# Patient Record
Sex: FEMALE | Race: WHITE | NOT HISPANIC OR LATINO | Employment: UNEMPLOYED | URBAN - METROPOLITAN AREA
[De-identification: names, ages, dates, MRNs, and addresses within clinical notes are randomized per-mention and may not be internally consistent; named-entity substitution may affect disease eponyms.]

---

## 2023-01-25 PROBLEM — I10 BENIGN ESSENTIAL HYPERTENSION: Status: ACTIVE | Noted: 2023-01-25

## 2023-01-25 PROBLEM — E66.9 CLASS 2 OBESITY WITH BODY MASS INDEX (BMI) OF 35.0 TO 35.9 IN ADULT: Status: ACTIVE | Noted: 2023-01-25

## 2023-01-25 PROBLEM — H91.93 BILATERAL HEARING LOSS: Status: ACTIVE | Noted: 2023-01-25

## 2023-01-25 PROBLEM — E66.812 CLASS 2 OBESITY WITH BODY MASS INDEX (BMI) OF 35.0 TO 35.9 IN ADULT: Status: ACTIVE | Noted: 2023-01-25

## 2023-01-25 PROBLEM — M79.671 PAIN OF RIGHT HEEL: Status: ACTIVE | Noted: 2023-01-25

## 2023-01-25 PROBLEM — F80.9 COMMUNICATION DEFICIT: Status: ACTIVE | Noted: 2023-01-25

## 2023-01-25 PROBLEM — R74.8 ELEVATED ALKALINE PHOSPHATASE LEVEL: Status: ACTIVE | Noted: 2023-01-25

## 2023-01-25 PROBLEM — I25.10 ATHSCL HEART DISEASE OF NATIVE CORONARY ARTERY W/O ANG PCTRS: Status: ACTIVE | Noted: 2023-01-25

## 2023-01-25 PROBLEM — M72.2 PLANTAR FASCIITIS, RIGHT: Status: ACTIVE | Noted: 2023-01-25

## 2023-01-25 PROBLEM — K21.9 GERD (GASTROESOPHAGEAL REFLUX DISEASE): Status: ACTIVE | Noted: 2023-01-25

## 2023-01-25 PROBLEM — M67.01 ACQUIRED SHORT ACHILLES TENDON OF RIGHT LOWER EXTREMITY: Status: ACTIVE | Noted: 2023-01-25

## 2023-01-25 PROBLEM — L85.3 DRY SKIN: Status: ACTIVE | Noted: 2023-01-25

## 2023-01-25 PROBLEM — E78.5 HYPERLIPIDEMIA: Status: ACTIVE | Noted: 2023-01-25

## 2023-03-07 ENCOUNTER — APPOINTMENT (OUTPATIENT)
Dept: PRIMARY CARE | Facility: CLINIC | Age: 53
End: 2023-03-07
Payer: COMMERCIAL

## 2023-03-09 ENCOUNTER — APPOINTMENT (OUTPATIENT)
Dept: PRIMARY CARE | Facility: CLINIC | Age: 53
End: 2023-03-09
Payer: COMMERCIAL

## 2023-03-16 ENCOUNTER — OFFICE VISIT (OUTPATIENT)
Dept: PRIMARY CARE | Facility: CLINIC | Age: 53
End: 2023-03-16
Payer: COMMERCIAL

## 2023-03-16 ENCOUNTER — LAB (OUTPATIENT)
Dept: LAB | Facility: LAB | Age: 53
End: 2023-03-16
Payer: COMMERCIAL

## 2023-03-16 VITALS
BODY MASS INDEX: 34.95 KG/M2 | HEIGHT: 60 IN | DIASTOLIC BLOOD PRESSURE: 77 MMHG | HEART RATE: 59 BPM | SYSTOLIC BLOOD PRESSURE: 136 MMHG | WEIGHT: 178 LBS | OXYGEN SATURATION: 96 %

## 2023-03-16 DIAGNOSIS — Z12.31 ENCOUNTER FOR SCREENING MAMMOGRAM FOR MALIGNANT NEOPLASM OF BREAST: ICD-10-CM

## 2023-03-16 DIAGNOSIS — I25.10 ATHSCL HEART DISEASE OF NATIVE CORONARY ARTERY W/O ANG PCTRS: ICD-10-CM

## 2023-03-16 DIAGNOSIS — Z00.00 ROUTINE GENERAL MEDICAL EXAMINATION AT HEALTH CARE FACILITY: Primary | ICD-10-CM

## 2023-03-16 DIAGNOSIS — H90.6 MIXED CONDUCTIVE AND SENSORINEURAL HEARING LOSS OF BOTH EARS: ICD-10-CM

## 2023-03-16 DIAGNOSIS — E78.2 MIXED HYPERLIPIDEMIA: ICD-10-CM

## 2023-03-16 DIAGNOSIS — K21.9 GASTROESOPHAGEAL REFLUX DISEASE WITHOUT ESOPHAGITIS: ICD-10-CM

## 2023-03-16 DIAGNOSIS — E66.9 CLASS 1 OBESITY WITH SERIOUS COMORBIDITY AND BODY MASS INDEX (BMI) OF 34.0 TO 34.9 IN ADULT, UNSPECIFIED OBESITY TYPE: ICD-10-CM

## 2023-03-16 DIAGNOSIS — Z23 NEED FOR VACCINATION: ICD-10-CM

## 2023-03-16 DIAGNOSIS — I10 BENIGN ESSENTIAL HYPERTENSION: ICD-10-CM

## 2023-03-16 PROBLEM — M72.2 PLANTAR FASCIITIS, RIGHT: Status: RESOLVED | Noted: 2023-01-25 | Resolved: 2023-03-16

## 2023-03-16 PROBLEM — M79.671 PAIN OF RIGHT HEEL: Status: RESOLVED | Noted: 2023-01-25 | Resolved: 2023-03-16

## 2023-03-16 LAB
ALANINE AMINOTRANSFERASE (SGPT) (U/L) IN SER/PLAS: 27 U/L (ref 7–45)
ALBUMIN (G/DL) IN SER/PLAS: 4.4 G/DL (ref 3.4–5)
ALKALINE PHOSPHATASE (U/L) IN SER/PLAS: 108 U/L (ref 33–110)
ANION GAP IN SER/PLAS: 15 MMOL/L (ref 10–20)
ASPARTATE AMINOTRANSFERASE (SGOT) (U/L) IN SER/PLAS: 20 U/L (ref 9–39)
BILIRUBIN TOTAL (MG/DL) IN SER/PLAS: 0.6 MG/DL (ref 0–1.2)
CALCIUM (MG/DL) IN SER/PLAS: 9.8 MG/DL (ref 8.6–10.3)
CARBON DIOXIDE, TOTAL (MMOL/L) IN SER/PLAS: 30 MMOL/L (ref 21–32)
CHLORIDE (MMOL/L) IN SER/PLAS: 101 MMOL/L (ref 98–107)
CHOLESTEROL (MG/DL) IN SER/PLAS: 147 MG/DL (ref 0–199)
CHOLESTEROL IN HDL (MG/DL) IN SER/PLAS: 46.5 MG/DL
CHOLESTEROL/HDL RATIO: 3.2
CREATININE (MG/DL) IN SER/PLAS: 0.71 MG/DL (ref 0.5–1.05)
ERYTHROCYTE DISTRIBUTION WIDTH (RATIO) BY AUTOMATED COUNT: 13.1 % (ref 11.5–14.5)
ERYTHROCYTE MEAN CORPUSCULAR HEMOGLOBIN CONCENTRATION (G/DL) BY AUTOMATED: 32.6 G/DL (ref 32–36)
ERYTHROCYTE MEAN CORPUSCULAR VOLUME (FL) BY AUTOMATED COUNT: 91 FL (ref 80–100)
ERYTHROCYTES (10*6/UL) IN BLOOD BY AUTOMATED COUNT: 4.83 X10E12/L (ref 4–5.2)
GFR FEMALE: >90 ML/MIN/1.73M2
GLUCOSE (MG/DL) IN SER/PLAS: 115 MG/DL (ref 74–99)
HEMATOCRIT (%) IN BLOOD BY AUTOMATED COUNT: 43.8 % (ref 36–46)
HEMOGLOBIN (G/DL) IN BLOOD: 14.3 G/DL (ref 12–16)
LDL: 78 MG/DL (ref 0–99)
LEUKOCYTES (10*3/UL) IN BLOOD BY AUTOMATED COUNT: 6.2 X10E9/L (ref 4.4–11.3)
PLATELETS (10*3/UL) IN BLOOD AUTOMATED COUNT: 281 X10E9/L (ref 150–450)
POTASSIUM (MMOL/L) IN SER/PLAS: 4.3 MMOL/L (ref 3.5–5.3)
PROTEIN TOTAL: 6.9 G/DL (ref 6.4–8.2)
SODIUM (MMOL/L) IN SER/PLAS: 142 MMOL/L (ref 136–145)
TRIGLYCERIDE (MG/DL) IN SER/PLAS: 115 MG/DL (ref 0–149)
UREA NITROGEN (MG/DL) IN SER/PLAS: 15 MG/DL (ref 6–23)
VLDL: 23 MG/DL (ref 0–40)

## 2023-03-16 PROCEDURE — 3008F BODY MASS INDEX DOCD: CPT | Performed by: NURSE PRACTITIONER

## 2023-03-16 PROCEDURE — 99213 OFFICE O/P EST LOW 20 MIN: CPT | Performed by: NURSE PRACTITIONER

## 2023-03-16 PROCEDURE — 3078F DIAST BP <80 MM HG: CPT | Performed by: NURSE PRACTITIONER

## 2023-03-16 PROCEDURE — 80061 LIPID PANEL: CPT

## 2023-03-16 PROCEDURE — 80053 COMPREHEN METABOLIC PANEL: CPT

## 2023-03-16 PROCEDURE — 90471 IMMUNIZATION ADMIN: CPT | Performed by: NURSE PRACTITIONER

## 2023-03-16 PROCEDURE — 36415 COLL VENOUS BLD VENIPUNCTURE: CPT

## 2023-03-16 PROCEDURE — 3075F SYST BP GE 130 - 139MM HG: CPT | Performed by: NURSE PRACTITIONER

## 2023-03-16 PROCEDURE — 1036F TOBACCO NON-USER: CPT | Performed by: NURSE PRACTITIONER

## 2023-03-16 PROCEDURE — 82652 VIT D 1 25-DIHYDROXY: CPT

## 2023-03-16 PROCEDURE — 90750 HZV VACC RECOMBINANT IM: CPT | Performed by: NURSE PRACTITIONER

## 2023-03-16 PROCEDURE — 85027 COMPLETE CBC AUTOMATED: CPT

## 2023-03-16 ASSESSMENT — ACTIVITIES OF DAILY LIVING (ADL)
GROCERY_SHOPPING: INDEPENDENT
BATHING: INDEPENDENT
DOING_HOUSEWORK: INDEPENDENT
MANAGING_FINANCES: INDEPENDENT
DRESSING: INDEPENDENT
TAKING_MEDICATION: INDEPENDENT

## 2023-03-16 ASSESSMENT — ENCOUNTER SYMPTOMS
OCCASIONAL FEELINGS OF UNSTEADINESS: 0
DEPRESSION: 0
LOSS OF SENSATION IN FEET: 0

## 2023-03-16 ASSESSMENT — PATIENT HEALTH QUESTIONNAIRE - PHQ9
2. FEELING DOWN, DEPRESSED OR HOPELESS: NOT AT ALL
SUM OF ALL RESPONSES TO PHQ9 QUESTIONS 1 AND 2: 0
1. LITTLE INTEREST OR PLEASURE IN DOING THINGS: NOT AT ALL

## 2023-03-16 NOTE — PATIENT INSTRUCTIONS
Thank you for seeing me today.  It was a pleasure to see you again!    Continue all medications as prescribed    See Cardiology in August    Lab work ordered today.  Please have your blood drawn in the next 1-2 weeks.  You need to be FASTING for 12 hours prior to blood draw.  You may only have water.  Please contact your insurance company to ask about the best location to get blood drawn.  We will contact you with the results of your blood work and any necessary adjustments  to your plan of care, if you do not hear from us within 3-5 days of having your blood drawn, please call the office at [phone].    Mammogram ordered for Dec 2023    RTC 1 YEAR AND AS NEEDED

## 2023-03-16 NOTE — ASSESSMENT & PLAN NOTE
Seeing cardiology, Dr. Uribe every 6 months   Taking Metoprolol, 100 mg, Amlodipine 10 mg and Lisinopril 10 mg  BP wbxto=110/77

## 2023-03-16 NOTE — PROGRESS NOTES
Subjective   Reason for Visit: Baylee Peck is an 52 y.o. female here for a Medicare Wellness visit and routine FU       HPI    No new issues since last follow-up, feels well.      PMH: HLD, CAD, GERD, HTN, DEAF     NICHELLE was used for this visit as pt is hearing impaired      #HLD  Taking Atorvastatin - no issues  denies myalgias     #HTN/CAD  Seeing cardiology, Dr. Uribe; LOV Feb 2023  Taking Metoprolol, 100 mg, Amlodipine 10 mg and Lisinopril 10 mg  BP xlvfn=969/77  Does not take at home, is looking for one to use at home     #GERD  Taking Omeprazole 20 mg  sx: CONTROLLED      #HM  MAMM: DUE DEC 2023--> ORDERED   PAP: 2021 - Due December 2024  COLOGUARD: Due 2024  FBW: due   SHINGLES: Will schedule with pharmacy, has not had yet  COVID: Both vaccines, no booster     Patient Care Team:  MARIBEL Reeves-CNP as PCP - General     Review of Systems  All 13 systems were reviewed and are within normal limits except positive and pertinent negative responses which are noted below or in HPI.      Objective   Vitals:  /77   Pulse 59   Ht 1.524 m (5')   Wt 80.7 kg (178 lb)   SpO2 96%   BMI 34.76 kg/m²       Physical Exam  Vitals reviewed.   Constitutional:       Appearance: Normal appearance. She is normal weight.   HENT:      Right Ear: Tympanic membrane normal. There is no impacted cerumen.      Left Ear: Tympanic membrane normal. There is no impacted cerumen.      Mouth/Throat:      Mouth: Mucous membranes are moist.   Eyes:      Conjunctiva/sclera: Conjunctivae normal.   Cardiovascular:      Rate and Rhythm: Normal rate.      Pulses: Normal pulses.      Heart sounds: Normal heart sounds.   Pulmonary:      Effort: Pulmonary effort is normal.   Abdominal:      General: Abdomen is flat. Bowel sounds are normal.      Palpations: Abdomen is soft.   Musculoskeletal:         General: Normal range of motion.   Skin:     General: Skin is warm and dry.      Capillary Refill: Capillary refill takes less than 2  seconds.   Neurological:      General: No focal deficit present.      Mental Status: She is alert.   Psychiatric:         Mood and Affect: Mood normal.         Assessment/Plan   Problem List Items Addressed This Visit          Circulatory    Athscl heart disease of native coronary artery w/o ang pctrs    Current Assessment & Plan     Seeing cardiology, Dr. Uribe; LOV   Taking Metoprolol, 100 mg         Relevant Orders    CBC    Benign essential hypertension    Current Assessment & Plan     Seeing cardiology, Dr. Uribe every 6 months   Taking Metoprolol, 100 mg, Amlodipine 10 mg and Lisinopril 10 mg  BP egrxg=528/77         Relevant Orders    Comprehensive Metabolic Panel    Lipid Panel       Digestive    GERD (gastroesophageal reflux disease)    Current Assessment & Plan     Taking Omeprazole 20 mg  sx: CONTROLLED             Other    Bilateral hearing loss    Hyperlipidemia    Current Assessment & Plan     Taking Atorvastatin - no issues  denies myalgias          Other Visit Diagnoses       Routine general medical examination at health care facility    -  Primary    Class 1 obesity with serious comorbidity and body mass index (BMI) of 34.0 to 34.9 in adult, unspecified obesity type        Relevant Orders    Vitamin D 1,25 Dihydroxy    Need for vaccination        Relevant Orders    Zoster vaccine, recombinant, adult (SHINGRIX)    Encounter for screening mammogram for malignant neoplasm of breast        Relevant Orders    BI mammo bilateral screening tomosynthesis

## 2023-03-20 LAB — VITAMIN D 1,25-DIHYDROXY: 84.8 PG/ML (ref 19.9–79.3)

## 2023-05-31 DIAGNOSIS — I10 BENIGN ESSENTIAL HYPERTENSION: ICD-10-CM

## 2023-05-31 RX ORDER — LISINOPRIL 10 MG/1
10 TABLET ORAL DAILY
Qty: 90 TABLET | Refills: 1 | Status: SHIPPED | OUTPATIENT
Start: 2023-05-31 | End: 2023-08-29

## 2023-06-13 ENCOUNTER — CLINICAL SUPPORT (OUTPATIENT)
Dept: PRIMARY CARE | Facility: CLINIC | Age: 53
End: 2023-06-13
Payer: COMMERCIAL

## 2023-06-13 DIAGNOSIS — Z23 IMMUNIZATION DUE: ICD-10-CM

## 2023-06-13 PROCEDURE — 90471 IMMUNIZATION ADMIN: CPT

## 2023-06-13 PROCEDURE — 90750 HZV VACC RECOMBINANT IM: CPT

## 2023-07-20 ENCOUNTER — TELEPHONE (OUTPATIENT)
Dept: PRIMARY CARE | Facility: CLINIC | Age: 53
End: 2023-07-20
Payer: COMMERCIAL

## 2023-07-20 DIAGNOSIS — I25.10 ATHSCL HEART DISEASE OF NATIVE CORONARY ARTERY W/O ANG PCTRS: Primary | ICD-10-CM

## 2023-07-20 NOTE — TELEPHONE ENCOUNTER
Pt called asking for a new letter for a Handicapped Placard.    Send back to me when complete, I will place in the mail per pt's request.

## 2023-10-30 ENCOUNTER — APPOINTMENT (OUTPATIENT)
Dept: CARDIOLOGY | Facility: CLINIC | Age: 53
End: 2023-10-30
Payer: COMMERCIAL

## 2023-11-02 PROBLEM — M79.643 HAND PAIN: Status: ACTIVE | Noted: 2023-11-02

## 2023-11-02 PROBLEM — J06.9 ACUTE UPPER RESPIRATORY INFECTION: Status: ACTIVE | Noted: 2023-11-02

## 2023-11-02 PROBLEM — B99.9 INFECTION: Status: ACTIVE | Noted: 2023-01-25

## 2023-11-02 PROBLEM — S69.90XA INJURY OF FINGER: Status: ACTIVE | Noted: 2023-11-02

## 2023-11-02 PROBLEM — K80.50 BILIARY COLIC: Status: ACTIVE | Noted: 2023-11-02

## 2023-11-02 PROBLEM — R06.02 SHORTNESS OF BREATH: Status: ACTIVE | Noted: 2023-11-02

## 2023-11-02 PROBLEM — J02.9 SORE THROAT: Status: ACTIVE | Noted: 2023-11-02

## 2023-11-02 PROBLEM — K62.89 RECTAL PAIN: Status: ACTIVE | Noted: 2023-11-02

## 2023-11-02 PROBLEM — I36.9 NONRHEUMATIC TRICUSPID VALVE DISORDER: Status: ACTIVE | Noted: 2023-11-02

## 2023-11-02 PROBLEM — I82.90 VENOUS THROMBOSIS: Status: ACTIVE | Noted: 2023-11-02

## 2023-11-02 PROBLEM — H91.90 PARTIAL DEAFNESS: Status: ACTIVE | Noted: 2023-11-02

## 2023-11-02 PROBLEM — D25.9 UTERINE LEIOMYOMA: Status: ACTIVE | Noted: 2023-11-02

## 2023-11-02 PROBLEM — R10.12 LEFT UPPER QUADRANT PAIN: Status: ACTIVE | Noted: 2023-11-02

## 2023-11-02 PROBLEM — E78.2 MIXED HYPERLIPIDEMIA: Status: ACTIVE | Noted: 2023-01-25

## 2023-11-02 PROBLEM — S09.92XA NOSE INJURY: Status: ACTIVE | Noted: 2023-11-02

## 2023-11-02 PROBLEM — M54.2 NECK PAIN: Status: ACTIVE | Noted: 2023-11-02

## 2023-11-02 PROBLEM — I25.2 OLD MYOCARDIAL INFARCTION: Status: ACTIVE | Noted: 2023-11-02

## 2023-11-02 PROBLEM — J00 COMMON COLD: Status: ACTIVE | Noted: 2023-11-02

## 2023-11-02 PROBLEM — J90 PLEURAL EFFUSION: Status: ACTIVE | Noted: 2023-11-02

## 2023-11-02 PROBLEM — K64.9 HEMORRHOIDS: Status: ACTIVE | Noted: 2023-11-02

## 2023-11-02 PROBLEM — R00.2 PALPITATIONS: Status: ACTIVE | Noted: 2023-11-02

## 2023-11-02 PROBLEM — R07.89 CHEST WALL PAIN: Status: ACTIVE | Noted: 2023-11-02

## 2023-11-02 PROBLEM — J01.90 ACUTE SINUSITIS: Status: ACTIVE | Noted: 2023-11-02

## 2023-11-02 PROBLEM — R10.2 PAIN IN PELVIS: Status: ACTIVE | Noted: 2023-11-02

## 2023-11-02 PROBLEM — R05.9 COUGH: Status: ACTIVE | Noted: 2023-11-02

## 2023-11-02 PROBLEM — J11.1 INFLUENZA-LIKE ILLNESS: Status: ACTIVE | Noted: 2023-11-02

## 2023-11-02 RX ORDER — RANOLAZINE 500 MG/1
500 TABLET, EXTENDED RELEASE ORAL 2 TIMES DAILY
COMMUNITY

## 2023-11-02 RX ORDER — ASPIRIN 81 MG/1
81 TABLET ORAL DAILY
COMMUNITY
End: 2024-02-08 | Stop reason: SDUPTHER

## 2023-11-02 RX ORDER — VIT C/E/ZN/COPPR/LUTEIN/ZEAXAN 250MG-90MG
25 CAPSULE ORAL DAILY
COMMUNITY
End: 2024-02-08 | Stop reason: SDUPTHER

## 2023-11-02 RX ORDER — METOPROLOL TARTRATE 25 MG/1
25 TABLET, FILM COATED ORAL 2 TIMES DAILY
COMMUNITY

## 2023-11-02 RX ORDER — LISINOPRIL 10 MG/1
10 TABLET ORAL DAILY
COMMUNITY
End: 2023-11-03 | Stop reason: SDUPTHER

## 2023-11-02 RX ORDER — LISINOPRIL 2.5 MG/1
2.5 TABLET ORAL DAILY
COMMUNITY

## 2023-11-02 RX ORDER — SULFAMETHOXAZOLE AND TRIMETHOPRIM 800; 160 MG/1; MG/1
1 TABLET ORAL 2 TIMES DAILY
COMMUNITY

## 2023-11-02 RX ORDER — ONDANSETRON 4 MG/1
4 TABLET, ORALLY DISINTEGRATING ORAL EVERY 6 HOURS PRN
COMMUNITY

## 2023-11-02 RX ORDER — FERROUS SULFATE 325(65) MG
65 TABLET ORAL DAILY
COMMUNITY

## 2023-11-02 RX ORDER — OMEPRAZOLE 20 MG/1
20 CAPSULE, DELAYED RELEASE ORAL DAILY
COMMUNITY

## 2023-11-02 RX ORDER — ASPIRIN 325 MG
325 TABLET ORAL DAILY
COMMUNITY

## 2023-11-02 RX ORDER — IBUPROFEN 600 MG/1
600 TABLET ORAL EVERY 6 HOURS PRN
COMMUNITY

## 2023-11-02 RX ORDER — CLOPIDOGREL BISULFATE 75 MG/1
75 TABLET ORAL DAILY
COMMUNITY

## 2023-11-02 RX ORDER — FERROUS SULFATE, DRIED 160(50) MG
1 TABLET, EXTENDED RELEASE ORAL
COMMUNITY

## 2023-11-02 RX ORDER — HYDROCODONE BITARTRATE AND ACETAMINOPHEN 5; 325 MG/1; MG/1
1 TABLET ORAL EVERY 6 HOURS PRN
COMMUNITY

## 2023-11-02 RX ORDER — ATORVASTATIN CALCIUM 80 MG/1
80 TABLET, FILM COATED ORAL DAILY
COMMUNITY

## 2023-11-02 RX ORDER — CYCLOBENZAPRINE HCL 10 MG
10 TABLET ORAL 2 TIMES DAILY PRN
COMMUNITY

## 2023-11-03 ENCOUNTER — OFFICE VISIT (OUTPATIENT)
Dept: CARDIOLOGY | Facility: CLINIC | Age: 53
End: 2023-11-03
Payer: COMMERCIAL

## 2023-11-03 VITALS
OXYGEN SATURATION: 98 % | RESPIRATION RATE: 18 BRPM | DIASTOLIC BLOOD PRESSURE: 75 MMHG | SYSTOLIC BLOOD PRESSURE: 140 MMHG | TEMPERATURE: 98.9 F | BODY MASS INDEX: 35.34 KG/M2 | HEART RATE: 59 BPM | HEIGHT: 60 IN | WEIGHT: 180 LBS

## 2023-11-03 DIAGNOSIS — I25.10 CORONARY ARTERY DISEASE INVOLVING NATIVE CORONARY ARTERY OF NATIVE HEART WITHOUT ANGINA PECTORIS: Primary | ICD-10-CM

## 2023-11-03 DIAGNOSIS — R00.2 PALPITATIONS: ICD-10-CM

## 2023-11-03 DIAGNOSIS — I10 PRIMARY HYPERTENSION: ICD-10-CM

## 2023-11-03 DIAGNOSIS — I25.2 OLD MYOCARDIAL INFARCTION: ICD-10-CM

## 2023-11-03 DIAGNOSIS — E78.2 MIXED HYPERLIPIDEMIA: ICD-10-CM

## 2023-11-03 PROCEDURE — 1036F TOBACCO NON-USER: CPT | Performed by: INTERNAL MEDICINE

## 2023-11-03 PROCEDURE — 99214 OFFICE O/P EST MOD 30 MIN: CPT | Performed by: INTERNAL MEDICINE

## 2023-11-03 PROCEDURE — 3078F DIAST BP <80 MM HG: CPT | Performed by: INTERNAL MEDICINE

## 2023-11-03 PROCEDURE — 3077F SYST BP >= 140 MM HG: CPT | Performed by: INTERNAL MEDICINE

## 2023-11-03 PROCEDURE — 3008F BODY MASS INDEX DOCD: CPT | Performed by: INTERNAL MEDICINE

## 2023-11-03 RX ORDER — LISINOPRIL 20 MG/1
20 TABLET ORAL DAILY
Qty: 90 TABLET | Refills: 3 | Status: SHIPPED | OUTPATIENT
Start: 2023-11-03 | End: 2024-11-02

## 2023-11-03 RX ORDER — AZITHROMYCIN 250 MG/1
250 TABLET, FILM COATED ORAL DAILY
COMMUNITY
Start: 2023-10-08

## 2023-11-03 RX ORDER — BENZONATATE 200 MG/1
200 CAPSULE ORAL 3 TIMES DAILY PRN
COMMUNITY
Start: 2023-10-08

## 2023-11-03 ASSESSMENT — PATIENT HEALTH QUESTIONNAIRE - PHQ9
SUM OF ALL RESPONSES TO PHQ9 QUESTIONS 1 AND 2: 0
1. LITTLE INTEREST OR PLEASURE IN DOING THINGS: NOT AT ALL
2. FEELING DOWN, DEPRESSED OR HOPELESS: NOT AT ALL

## 2023-11-03 ASSESSMENT — PAIN SCALES - GENERAL: PAINLEVEL: 0-NO PAIN

## 2023-11-03 NOTE — PROGRESS NOTES
History of present illness:    53 year old female patient here today following up on hx of CAD status post PCI to LAD. Patient returns to my office for follow-up. She did have cardiac catheterization 2019 that showed stable coronary artery disease and patent LAD stent.  Patient returns to my office for follow-up.  Doing overall good denies any chest pain or shortness of breath.  No dizziness lightheadedness or palpitations.    Past Medical History:   Diagnosis Date    Athscl heart disease of native coronary artery w/o ang pctrs 01/25/2023    Benign essential hypertension 01/25/2023    Chondrocostal junction syndrome (tietze) 04/07/2020    Costochondritis    Coronary arteriosclerosis 01/25/2023    Encounter for follow-up examination after completed treatment for conditions other than malignant neoplasm 04/07/2020    Hospital discharge follow-up    Encounter for follow-up examination after completed treatment for conditions other than malignant neoplasm 04/23/2019    Hospital discharge follow-up    Hyperlipidemia 01/25/2023    Hypertensive disorder 01/25/2023    Left upper quadrant pain 05/19/2020    Left upper quadrant abdominal pain    Mixed hyperlipidemia 01/25/2023    Nonrheumatic tricuspid valve disorder 11/02/2023    Old myocardial infarction 11/02/2023    Palpitations 11/02/2023    Personal history of diseases of the blood and blood-forming organs and certain disorders involving the immune mechanism 07/06/2016    History of iron deficiency anemia    Personal history of diseases of the blood and blood-forming organs and certain disorders involving the immune mechanism 05/27/2017    History of anemia    Personal history of other diseases of the digestive system 11/13/2014    History of constipation    Personal history of other diseases of the musculoskeletal system and connective tissue 05/21/2019    History of low back pain    Personal history of other diseases of the respiratory system 10/24/2019    History of  acute sinusitis    Personal history of other drug therapy 10/28/2016    History of influenza vaccination    Personal history of other endocrine, nutritional and metabolic disease 10/05/2018    History of hyperglycemia    Radiculopathy, site unspecified 11/13/2014    Radiculitis of leg    Strain of muscle, fascia and tendon of lower back, initial encounter 11/13/2014    Lumbar strain       Past Surgical History:   Procedure Laterality Date    OTHER SURGICAL HISTORY  11/12/2014    Thorax Excision Of Soft Tissue Tumor    OTHER SURGICAL HISTORY  03/09/2022    Cardiac catheterization with stent placement       Allergies   Allergen Reactions    Acetaminophen Other    Amoxicillin Hives    Nitroglycerin Unknown and Itching    Diphenhydramine Hcl Other    Nitro Itching        reports that she has never smoked. She has been exposed to tobacco smoke. She has never used smokeless tobacco. She reports that she does not drink alcohol and does not use drugs.    Family History   Problem Relation Name Age of Onset    Other (malignant neoplasm of female breast) Mother      Diabetes Father      No Known Problems Father's Sister      No Known Problems Father's Brother      Heart attack Maternal Grandmother      No Known Problems Paternal Grandmother      No Known Problems Paternal Grandfather         Patient's Medications   New Prescriptions    No medications on file   Previous Medications    AMLODIPINE (NORVASC) 10 MG TABLET    amLODIPine Besylate 10 MG Oral Tablet   Quantity: 90  Refills: 0        Start : 8-Aug-2022   Active    AMLODIPINE BESYLATE, BULK, MISC    Take 10 mg by mouth once daily.    ASPIRIN 325 MG TABLET    Take 1 tablet (325 mg) by mouth once daily.    ASPIRIN 325 MG TABLET    Take 1 tablet (325 mg) by mouth once daily.    ASPIRIN 81 MG EC TABLET    Take 1 tablet (81 mg) by mouth once daily.    ATORVASTATIN (LIPITOR) 40 MG TABLET    Take 1 tablet (40 mg) by mouth once daily.    ATORVASTATIN (LIPITOR) 80 MG TABLET     Take 1 tablet (80 mg) by mouth once daily.    AZITHROMYCIN (ZITHROMAX) 250 MG TABLET    Take 1 tablet (250 mg) by mouth once daily.    BENZONATATE (TESSALON) 200 MG CAPSULE    Take 1 capsule (200 mg) by mouth 3 times a day as needed for cough.    CALCIUM CARBONATE-VITAMIN D3 (OYSTER SHELL CALCIUM-VIT D3) 500 MG-5 MCG (200 UNIT) TABLET    Take 1 tablet by mouth once daily with a meal.    CHOLECALCIFEROL (VITAMIN D-3) 25 MCG (1000 UT) CAPSULE    Take 1 capsule (25 mcg) by mouth once daily.    CLOPIDOGREL (PLAVIX) 75 MG TABLET    Take 1 tablet (75 mg) by mouth once daily.    CYCLOBENZAPRINE (FLEXERIL) 10 MG TABLET    Take 1 tablet (10 mg) by mouth 2 times a day as needed for muscle spasms.    ERGOCALCIFEROL, VITAMIN D2, (VITAMIN D2 ORAL)    Vitamin D TABS   Refills: 0       Active    FERROUS SULFATE 325 (65 FE) MG TABLET    Take 1 tablet (65 mg of iron) by mouth once daily.    HYDROCODONE-ACETAMINOPHEN (NORCO) 5-325 MG TABLET    Take 1 tablet by mouth every 6 hours if needed for severe pain (7 - 10).    IBUPROFEN 600 MG TABLET    Take 1 tablet (600 mg) by mouth every 6 hours if needed for moderate pain (4 - 6).    LISINOPRIL 10 MG TABLET    Take 1 tablet (10 mg) by mouth once daily. For Blood pressure    LISINOPRIL 10 MG TABLET    Take 1 tablet (10 mg) by mouth once daily.    LISINOPRIL 2.5 MG TABLET    Take 1 tablet (2.5 mg) by mouth once daily.    METOPROLOL TARTRATE (LOPRESSOR) 100 MG TABLET    Take 1 tablet (100 mg) by mouth once daily.    METOPROLOL TARTRATE (LOPRESSOR) 25 MG TABLET    Take 1 tablet (25 mg) by mouth 2 times a day.    OMEPRAZOLE (PRILOSEC) 20 MG DR CAPSULE    Take 1 capsule (20 mg) by mouth once daily.    OMEPRAZOLE (PRILOSEC) 20 MG DR CAPSULE    Take 1 capsule (20 mg) by mouth once daily.    ONDANSETRON ODT (ZOFRAN-ODT) 4 MG DISINTEGRATING TABLET    Take 1 tablet (4 mg) by mouth every 6 hours if needed for vomiting or nausea.    RANOLAZINE (RANEXA) 500 MG 12 HR TABLET    Take 1 tablet (500 mg) by  mouth 2 times a day.    SULFAMETHOXAZOLE-TRIMETHOPRIM (BACTRIM DS) 800-160 MG TABLET    Take 1 tablet by mouth 2 times a day.   Modified Medications    No medications on file   Discontinued Medications    No medications on file       Objective   Physical Exam  General: Patient in no acute distress   HEENT: Atraumatic normocephalic.  Neck: Supple, jugular venous pressure within normal limit.  No bruits  Lungs: Clear to auscultation bilaterally  Cardiovascular: Regular rate and rhythm, normal heart sounds, no murmurs rubs or gallops  Abdomen: Soft nontender nondistended.  Normal bowel sounds.  Extremities: Warm to touch, no edema.      Lab Review   No visits with results within 2 Month(s) from this visit.   Latest known visit with results is:   Lab on 03/16/2023   Component Date Value    WBC 03/16/2023 6.2     RBC 03/16/2023 4.83     Hemoglobin 03/16/2023 14.3     Hematocrit 03/16/2023 43.8     MCV 03/16/2023 91     MCHC 03/16/2023 32.6     Platelets 03/16/2023 281     RDW 03/16/2023 13.1     Glucose 03/16/2023 115 (H)     Sodium 03/16/2023 142     Potassium 03/16/2023 4.3     Chloride 03/16/2023 101     Bicarbonate 03/16/2023 30     Anion Gap 03/16/2023 15     Urea Nitrogen 03/16/2023 15     Creatinine 03/16/2023 0.71     GFR Female 03/16/2023 >90     Calcium 03/16/2023 9.8     Albumin 03/16/2023 4.4     Alkaline Phosphatase 03/16/2023 108     Total Protein 03/16/2023 6.9     AST 03/16/2023 20     Total Bilirubin 03/16/2023 0.6     ALT (SGPT) 03/16/2023 27     Cholesterol 03/16/2023 147     HDL 03/16/2023 46.5     Cholesterol/HDL Ratio 03/16/2023 3.2     LDL 03/16/2023 78     VLDL 03/16/2023 23     Triglycerides 03/16/2023 115     Vit D, 1,25-Dihydroxy 03/16/2023 84.8 (H)         Assessment/Plan   Patient Active Problem List   Diagnosis    Coronary arteriosclerosis    Hypertensive disorder    Bilateral hearing loss    Dry skin    Infection    Elevated alkaline phosphatase level    Gastroesophageal reflux disease     Mixed hyperlipidemia    Acquired short Achilles tendon of right lower extremity    Class 2 obesity with body mass index (BMI) of 35.0 to 35.9 in adult    Neck pain    Pain in pelvis    Hand pain    Hemorrhoids    Influenza-like illness    Injury of finger    Left upper quadrant pain    Nonrheumatic tricuspid valve disorder    Nose injury    Old myocardial infarction    Palpitations    Partial deafness    Pleural effusion    Rectal pain    Shortness of breath    Sore throat    Uterine leiomyoma    Venous thrombosis    Acute sinusitis    Acute upper respiratory infection    Chest wall pain    Biliary colic    Common cold    Cough      53 year old female patient here today following up on hx of CAD status post PCI to LAD. Patient returns to my office for follow-up. She did have cardiac catheterization 2019 that showed stable coronary artery disease and patent LAD stent.  Patient returns to my office for follow-up.  Doing overall good denies any chest pain or shortness of breath.  No dizziness lightheadedness or palpitations.  Blood pressure running on the high normal level in the 140s.  I will increase her lisinopril to 20 mg oral daily.  We will also repeat another lipid panel since I increased her atorvastatin to 80 mg daily 6 weeks ago to make sure she is at better target her last LDL was 76.  Otherwise we will follow-up in 6 months.    Aram Parsons MD

## 2023-11-08 ENCOUNTER — TELEPHONE (OUTPATIENT)
Dept: PRIMARY CARE | Facility: CLINIC | Age: 53
End: 2023-11-08

## 2023-11-08 ENCOUNTER — LAB (OUTPATIENT)
Dept: LAB | Facility: LAB | Age: 53
End: 2023-11-08
Payer: COMMERCIAL

## 2023-11-08 DIAGNOSIS — E78.2 MIXED HYPERLIPIDEMIA: ICD-10-CM

## 2023-11-08 DIAGNOSIS — Z12.31 ENCOUNTER FOR SCREENING MAMMOGRAM FOR MALIGNANT NEOPLASM OF BREAST: ICD-10-CM

## 2023-11-08 DIAGNOSIS — Z12.39 BREAST SCREENING: Primary | ICD-10-CM

## 2023-11-08 LAB
CHOLEST SERPL-MCNC: 110 MG/DL (ref 0–199)
CHOLESTEROL/HDL RATIO: 2.5
HDLC SERPL-MCNC: 44.1 MG/DL
LDLC SERPL CALC-MCNC: 45 MG/DL
NON HDL CHOLESTEROL: 66 MG/DL (ref 0–149)
TRIGL SERPL-MCNC: 107 MG/DL (ref 0–149)
VLDL: 21 MG/DL (ref 0–40)

## 2023-11-08 PROCEDURE — 80061 LIPID PANEL: CPT

## 2023-11-08 PROCEDURE — 36415 COLL VENOUS BLD VENIPUNCTURE: CPT

## 2023-11-08 NOTE — TELEPHONE ENCOUNTER
Patient came in requesting a mammogram order be placed. Asked that it be mailed to her when ready so she knows it's okay to schedule appointment.

## 2023-12-13 NOTE — PROGRESS NOTES
Patient ID: Baylee Peck is a 53 y.o. female.  Primary Care Provider: Amanda Freeman, APRN-CNP    Subjective    History of stage IIB squamous cell cervical cancer diagnosed in November 2014.    Pretreatment MRI suggested cancer was extending to the outer margins of her cervix, abutting the rectum, and infiltrating into the proximal parametrial fat.  She had one left internal iliac lymph node which was PET avid.    She received chemoradiation therapy.  External beam radiation was completed in January 2015.  She then received 5 high-dose rate brachytherapy treatments at [facility].    This was completed in February 2015      Objective    Visit Vitals  /83 (BP Location: Right arm, Patient Position: Sitting, BP Cuff Size: Adult)   Pulse 76   Resp 20        Interval history: Patient is a 53 year old female with history of stage 2B squamous cell cervical cancer s/p chemoradiation followed by vaginal brachytherapy. Last tx 2/2015. 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  Last pap 12/2022 was negative/HPV-, showed atrophy.  Here for annual exam and pap.  Denies vaginal bleeding, pink-tinged discharge, constipation, diarrhea, urinary incontinence, or hematuria. Energy levels and appetite are adequate and unchanged. Not currently sexually active. States she is up to date on annual mammogram.      Physical Exam:    Constitutional: Doing well. KIERAN  Eyes: PERRL  ENMT: Moist mucus membranes  Head/Neck: Supple. Symmetrical  Cardiovascular: Regular, rate and rhythm. 2+ equal pulses of the extremities  Respiratory/Thorax: CTA. RRR. Chest rise symmetrical.  Gastrointestinal: Non-distended, soft, non-tender  Genitourinary: Cervical borders obliterated, os not visualized, no lesions noted, radiation changes noted.   Smooth vaginal walls noted.  No vulvar lesions noted.   Musculoskeletal: ROM intact, no joint swelling, normal strength  Extremities: No edema  Neurological: Alert and oriented x 3. Pleasant and  cooperative.  Lymphatic: No lymphadenopathy. No lymphedema  Psychological: Appropriate mood and behavior  Skin: Warm and dry, no lesions, no rashes    A complete review of systems was performed and all systems were normal except what is noted in the interval history.          Assessment/Plan   Patient is a 53 year old female with Stage 2B squamous cell cervical cancer s/p chemoradiation followed by vaginal brachytherapy. Last tx 2/2015. KIERAN 8 yr.       PLAN:  Pap today,F/U results  If negative F/U in 1 year or as needed  Physical examination was within normal limits today.  She is currently KIERAN.  We reviewed signs and symptoms of possible recurrence with the patient and she will call our office should she experience any of these.       MONA Zambrano     I was present with the APRN student who participated in the documentation of this note.  I have personally seen and re-examined the patient and performed the medical decision-making components (assessment and plan of care). I have reviewed the APRN student documentation and verified the findings in the note as written with additions or exceptions as stated in the body of this note.

## 2023-12-14 ENCOUNTER — OFFICE VISIT (OUTPATIENT)
Dept: GYNECOLOGIC ONCOLOGY | Facility: CLINIC | Age: 53
End: 2023-12-14
Payer: COMMERCIAL

## 2023-12-14 VITALS
HEART RATE: 76 BPM | SYSTOLIC BLOOD PRESSURE: 121 MMHG | RESPIRATION RATE: 20 BRPM | BODY MASS INDEX: 33.31 KG/M2 | WEIGHT: 181 LBS | DIASTOLIC BLOOD PRESSURE: 83 MMHG | HEIGHT: 62 IN

## 2023-12-14 DIAGNOSIS — C53.9 MALIGNANT NEOPLASM OF CERVIX, UNSPECIFIED SITE (MULTI): Primary | ICD-10-CM

## 2023-12-14 PROCEDURE — 3074F SYST BP LT 130 MM HG: CPT | Performed by: NURSE PRACTITIONER

## 2023-12-14 PROCEDURE — 1036F TOBACCO NON-USER: CPT | Performed by: NURSE PRACTITIONER

## 2023-12-14 PROCEDURE — 3008F BODY MASS INDEX DOCD: CPT | Performed by: NURSE PRACTITIONER

## 2023-12-14 PROCEDURE — 87624 HPV HI-RISK TYP POOLED RSLT: CPT

## 2023-12-14 PROCEDURE — 3079F DIAST BP 80-89 MM HG: CPT | Performed by: NURSE PRACTITIONER

## 2023-12-14 PROCEDURE — 99213 OFFICE O/P EST LOW 20 MIN: CPT | Performed by: NURSE PRACTITIONER

## 2023-12-14 ASSESSMENT — PAIN SCALES - GENERAL: PAINLEVEL: 0-NO PAIN

## 2023-12-14 NOTE — PROGRESS NOTES
Patient ID: Baylee Peck is a 53 y.o. female.  Primary Care Provider: Amanda Freeman, APRN-CNP        Subjective   History of stage IIB squamous cell cervical cancer diagnosed in November 2014.     Pretreatment MRI suggested cancer was extending to the outer margins of her cervix, abutting the rectum, and infiltrating into the proximal parametrial fat.  She had one left internal iliac lymph node which was PET avid.     She received chemoradiation therapy.  External beam radiation was completed in January 2015.  She then received 5 high-dose rate brachytherapy treatments at [facility].     This was completed in February 2015              Objective   There were no vitals taken for this visit.      Interval history: Patient is a 53 year old female with history of stage 2B squamous cell cervical cancer s/p chemoradiation followed by vaginal brachytherapy. Last tx 2/2015. 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  Last pap 12/2022 was negative/HPV-, showed atrophy.  Here for annual exam and pap.      Denies vaginal bleeding, pink-tinged discharge, constipation, diarrhea, urinary incontinence, or hematuria. Energy levels and appetite are adequate and unchanged. Not currently sexually active. States she is up to date on annual mammogram.     Physical Exam:     Constitutional: Doing well. KIERAN  Eyes: PERRL  ENMT: Moist mucus membranes  Head/Neck: Supple. Symmetrical  Cardiovascular: Regular, rate and rhythm. 2+ equal pulses of the extremities  Respiratory/Thorax: CTA. RRR. Chest rise symmetrical.  Gastrointestinal: Non-distended, soft, non-tender  Genitourinary:   Normal external female genitalia. No vulvar lesions noted  Speculum exam: Smooth vaginal walls without lesions or masses. Vaginal cuff visualized without lesions.   Bimanual exam: Smooth vaginal wall without lesions or masses.    Rectovaginal exam: smooth rectovaginal septum without lesions or masses  Musculoskeletal: ROM intact, no joint swelling, normal  strength  Extremities: No edema  Neurological: Alert and oriented x 3. Pleasant and cooperative.  Lymphatic: No lymphadenopathy. No lymphedema  Psychological: Appropriate mood and behavior  Skin: Warm and dry, no lesions, no rashes     A complete review of systems was performed and all systems were normal except what is noted in the interval history.        Assessment/Plan   Stage 2B squamous cell cervical cancer s/p chemoradiation followed by vaginal brachytherapy. Last tx 2/2015. KIERAN 7 yr.    PLAN:    Schedule next appointment pending pap smear results. If (-), schedule annual follow-up. If (+), schedule follow-up at your earliest convenience.  Call the office to schedule a sooner appointment if new or worsening symptoms occur.       MONA Zambrano    I was present with the APRN student who participated in the documentation of this note.  I have personally seen and re-examined the patient and performed the medical decision-making components (assessment and plan of care). I have reviewed the APRN student documentation and verified the findings in the note as written with additions or exceptions as stated in the body of this note.

## 2023-12-19 ENCOUNTER — HOSPITAL ENCOUNTER (OUTPATIENT)
Dept: RADIOLOGY | Facility: HOSPITAL | Age: 53
Discharge: HOME | End: 2023-12-19
Payer: COMMERCIAL

## 2023-12-19 VITALS — BODY MASS INDEX: 35.34 KG/M2 | HEIGHT: 60 IN | WEIGHT: 180 LBS

## 2023-12-19 DIAGNOSIS — Z12.31 ENCOUNTER FOR SCREENING MAMMOGRAM FOR MALIGNANT NEOPLASM OF BREAST: ICD-10-CM

## 2023-12-19 DIAGNOSIS — Z12.39 BREAST SCREENING: ICD-10-CM

## 2023-12-19 PROCEDURE — 77063 BREAST TOMOSYNTHESIS BI: CPT

## 2023-12-19 PROCEDURE — 77067 SCR MAMMO BI INCL CAD: CPT

## 2024-01-04 LAB
CYTOLOGY CMNT CVX/VAG CYTO-IMP: NORMAL
HPV HR 12 DNA GENITAL QL NAA+PROBE: NEGATIVE
HPV HR GENOTYPES PNL CVX NAA+PROBE: NEGATIVE
HPV16 DNA SPEC QL NAA+PROBE: NEGATIVE
HPV18 DNA SPEC QL NAA+PROBE: NEGATIVE
LAB AP HPV GENOTYPE QUESTION: YES
LAB AP HPV HR: NORMAL
LABORATORY COMMENT REPORT: NORMAL
PATH REPORT.TOTAL CANCER: NORMAL

## 2024-01-08 DIAGNOSIS — I10 PRIMARY HYPERTENSION: Primary | ICD-10-CM

## 2024-01-09 RX ORDER — METOPROLOL TARTRATE 100 MG/1
100 TABLET ORAL DAILY
Qty: 90 TABLET | Refills: 3 | Status: SHIPPED | OUTPATIENT
Start: 2024-01-09 | End: 2025-01-03

## 2024-01-11 ENCOUNTER — TELEPHONE (OUTPATIENT)
Dept: GYNECOLOGIC ONCOLOGY | Facility: HOSPITAL | Age: 54
End: 2024-01-11
Payer: COMMERCIAL

## 2024-01-11 NOTE — TELEPHONE ENCOUNTER
Phoned patient, notified her that pap results showed negative HPV and Bety recommends keeping appointment as scheduled in December 2024.

## 2024-02-08 DIAGNOSIS — I25.10 CORONARY ARTERY DISEASE INVOLVING NATIVE CORONARY ARTERY OF NATIVE HEART WITHOUT ANGINA PECTORIS: Primary | ICD-10-CM

## 2024-02-08 RX ORDER — VIT C/E/ZN/COPPR/LUTEIN/ZEAXAN 250MG-90MG
25 CAPSULE ORAL DAILY
Qty: 90 CAPSULE | Refills: 3 | Status: SHIPPED | OUTPATIENT
Start: 2024-02-08 | End: 2025-02-02

## 2024-02-08 RX ORDER — ASPIRIN 81 MG/1
81 TABLET ORAL DAILY
Qty: 90 TABLET | Refills: 3 | Status: SHIPPED | OUTPATIENT
Start: 2024-02-08 | End: 2025-02-02

## 2024-02-08 NOTE — TELEPHONE ENCOUNTER
Pt has called in to request a refill     Requested Prescriptions     Pending Prescriptions Disp Refills    cholecalciferol (Vitamin D-3) 25 MCG (1000 UT) capsule 90 capsule 3     Sig: Take 1 capsule (25 mcg) by mouth once daily.    aspirin 81 mg EC tablet 90 tablet 3     Sig: Take 1 tablet (81 mg) by mouth once daily.

## 2024-05-03 ENCOUNTER — OFFICE VISIT (OUTPATIENT)
Dept: CARDIOLOGY | Facility: CLINIC | Age: 54
End: 2024-05-03
Payer: COMMERCIAL

## 2024-05-03 VITALS
RESPIRATION RATE: 18 BRPM | HEIGHT: 60 IN | BODY MASS INDEX: 35.34 KG/M2 | DIASTOLIC BLOOD PRESSURE: 77 MMHG | WEIGHT: 180 LBS | SYSTOLIC BLOOD PRESSURE: 126 MMHG | OXYGEN SATURATION: 98 % | TEMPERATURE: 98.6 F | HEART RATE: 62 BPM

## 2024-05-03 DIAGNOSIS — I25.2 OLD MYOCARDIAL INFARCTION: ICD-10-CM

## 2024-05-03 DIAGNOSIS — R06.02 SHORTNESS OF BREATH: ICD-10-CM

## 2024-05-03 DIAGNOSIS — I10 PRIMARY HYPERTENSION: ICD-10-CM

## 2024-05-03 DIAGNOSIS — I36.9 NONRHEUMATIC TRICUSPID VALVE DISORDER: ICD-10-CM

## 2024-05-03 DIAGNOSIS — E78.2 MIXED HYPERLIPIDEMIA: ICD-10-CM

## 2024-05-03 DIAGNOSIS — R00.2 PALPITATIONS: Primary | ICD-10-CM

## 2024-05-03 PROCEDURE — 93000 ELECTROCARDIOGRAM COMPLETE: CPT | Performed by: INTERNAL MEDICINE

## 2024-05-03 PROCEDURE — 3074F SYST BP LT 130 MM HG: CPT | Performed by: INTERNAL MEDICINE

## 2024-05-03 PROCEDURE — 99214 OFFICE O/P EST MOD 30 MIN: CPT | Performed by: INTERNAL MEDICINE

## 2024-05-03 PROCEDURE — 1036F TOBACCO NON-USER: CPT | Performed by: INTERNAL MEDICINE

## 2024-05-03 PROCEDURE — 3078F DIAST BP <80 MM HG: CPT | Performed by: INTERNAL MEDICINE

## 2024-05-03 ASSESSMENT — ENCOUNTER SYMPTOMS
LOSS OF SENSATION IN FEET: 0
OCCASIONAL FEELINGS OF UNSTEADINESS: 0
DEPRESSION: 0

## 2024-05-03 ASSESSMENT — LIFESTYLE VARIABLES
AUDIT-C TOTAL SCORE: 0
SKIP TO QUESTIONS 9-10: 1
HOW OFTEN DO YOU HAVE A DRINK CONTAINING ALCOHOL: NEVER
HOW OFTEN DO YOU HAVE SIX OR MORE DRINKS ON ONE OCCASION: NEVER
HOW MANY STANDARD DRINKS CONTAINING ALCOHOL DO YOU HAVE ON A TYPICAL DAY: PATIENT DOES NOT DRINK

## 2024-05-03 ASSESSMENT — PATIENT HEALTH QUESTIONNAIRE - PHQ9
1. LITTLE INTEREST OR PLEASURE IN DOING THINGS: NOT AT ALL
SUM OF ALL RESPONSES TO PHQ9 QUESTIONS 1 AND 2: 0
2. FEELING DOWN, DEPRESSED OR HOPELESS: NOT AT ALL

## 2024-05-03 ASSESSMENT — PAIN SCALES - GENERAL: PAINLEVEL: 0-NO PAIN

## 2024-05-03 NOTE — PROGRESS NOTES
History of present illness:  53 year old female patient here today following up on hx of CAD status post PCI to LAD. Patient returns to my office for follow-up. She did have cardiac catheterization 2019 that showed stable coronary artery disease and patent LAD stent.  Patient returns to my office for follow-up.  Doing overall good denies any chest pain or shortness of breath.  No dizziness lightheadedness or palpitations.       Past Medical History:   Diagnosis Date    Athscl heart disease of native coronary artery w/o ang pctrs 01/25/2023    Benign essential hypertension 01/25/2023    Chondrocostal junction syndrome (tietze) 04/07/2020    Costochondritis    Coronary arteriosclerosis 01/25/2023    Encounter for follow-up examination after completed treatment for conditions other than malignant neoplasm 04/07/2020    Hospital discharge follow-up    Encounter for follow-up examination after completed treatment for conditions other than malignant neoplasm 04/23/2019    Hospital discharge follow-up    Hyperlipidemia 01/25/2023    Hypertensive disorder 01/25/2023    Left upper quadrant pain 05/19/2020    Left upper quadrant abdominal pain    Mixed hyperlipidemia 01/25/2023    Nonrheumatic tricuspid valve disorder 11/02/2023    Old myocardial infarction 11/02/2023    Palpitations 11/02/2023    Personal history of diseases of the blood and blood-forming organs and certain disorders involving the immune mechanism 07/06/2016    History of iron deficiency anemia    Personal history of diseases of the blood and blood-forming organs and certain disorders involving the immune mechanism 05/27/2017    History of anemia    Personal history of other diseases of the digestive system 11/13/2014    History of constipation    Personal history of other diseases of the musculoskeletal system and connective tissue 05/21/2019    History of low back pain    Personal history of other diseases of the respiratory system 10/24/2019    History of  acute sinusitis    Personal history of other drug therapy 10/28/2016    History of influenza vaccination    Personal history of other endocrine, nutritional and metabolic disease 10/05/2018    History of hyperglycemia    Radiculopathy, site unspecified 11/13/2014    Radiculitis of leg    Strain of muscle, fascia and tendon of lower back, initial encounter 11/13/2014    Lumbar strain       Past Surgical History:   Procedure Laterality Date    OTHER SURGICAL HISTORY  11/12/2014    Thorax Excision Of Soft Tissue Tumor    OTHER SURGICAL HISTORY  03/09/2022    Cardiac catheterization with stent placement       Allergies   Allergen Reactions    Acetaminophen Other    Amoxicillin Hives    Nitroglycerin Unknown and Itching    Diphenhydramine Hcl Other    Nitro Itching        reports that she has never smoked. She has been exposed to tobacco smoke. She has never used smokeless tobacco. She reports that she does not drink alcohol and does not use drugs.    Family History   Problem Relation Name Age of Onset    Other (malignant neoplasm of female breast) Mother      Diabetes Father      No Known Problems Father's Sister      No Known Problems Father's Brother      Heart attack Maternal Grandmother      No Known Problems Paternal Grandmother      No Known Problems Paternal Grandfather         Patient's Medications   New Prescriptions    No medications on file   Previous Medications    AMLODIPINE (NORVASC) 10 MG TABLET    amLODIPine Besylate 10 MG Oral Tablet   Quantity: 90  Refills: 0        Start : 8-Aug-2022   Active    AMLODIPINE BESYLATE, BULK, MISC    Take 10 mg by mouth once daily.    ASPIRIN 325 MG TABLET    Take 1 tablet (325 mg) by mouth once daily.    ASPIRIN 325 MG TABLET    Take 1 tablet (325 mg) by mouth once daily.    ASPIRIN 81 MG EC TABLET    Take 1 tablet (81 mg) by mouth once daily.    ATORVASTATIN (LIPITOR) 40 MG TABLET    Take 1 tablet (40 mg) by mouth once daily.    ATORVASTATIN (LIPITOR) 80 MG TABLET     Take 1 tablet (80 mg) by mouth once daily.    AZITHROMYCIN (ZITHROMAX) 250 MG TABLET    Take 1 tablet (250 mg) by mouth once daily.    BENZONATATE (TESSALON) 200 MG CAPSULE    Take 1 capsule (200 mg) by mouth 3 times a day as needed for cough.    CALCIUM CARBONATE-VITAMIN D3 (OYSTER SHELL CALCIUM-VIT D3) 500 MG-5 MCG (200 UNIT) TABLET    Take 1 tablet by mouth once daily with breakfast.    CHOLECALCIFEROL (VITAMIN D-3) 25 MCG (1000 UT) CAPSULE    Take 1 capsule (25 mcg) by mouth once daily.    CLOPIDOGREL (PLAVIX) 75 MG TABLET    Take 1 tablet (75 mg) by mouth once daily.    CYCLOBENZAPRINE (FLEXERIL) 10 MG TABLET    Take 1 tablet (10 mg) by mouth 2 times a day as needed for muscle spasms.    FERROUS SULFATE 325 (65 FE) MG TABLET    Take 1 tablet by mouth once daily.    HYDROCODONE-ACETAMINOPHEN (NORCO) 5-325 MG TABLET    Take 1 tablet by mouth every 6 hours if needed for severe pain (7 - 10).    IBUPROFEN 600 MG TABLET    Take 1 tablet (600 mg) by mouth every 6 hours if needed for moderate pain (4 - 6).    LISINOPRIL 10 MG TABLET    Take 1 tablet (10 mg) by mouth once daily. For Blood pressure    LISINOPRIL 2.5 MG TABLET    Take 1 tablet (2.5 mg) by mouth once daily.    LISINOPRIL 20 MG TABLET    Take 1 tablet (20 mg) by mouth once daily.    METOPROLOL TARTRATE (LOPRESSOR) 100 MG TABLET    Take 1 tablet (100 mg) by mouth once daily.    METOPROLOL TARTRATE (LOPRESSOR) 25 MG TABLET    Take 1 tablet (25 mg) by mouth 2 times a day.    OMEPRAZOLE (PRILOSEC) 20 MG DR CAPSULE    Take 1 capsule (20 mg) by mouth once daily.    ONDANSETRON ODT (ZOFRAN-ODT) 4 MG DISINTEGRATING TABLET    Take 1 tablet (4 mg) by mouth every 6 hours if needed for vomiting or nausea.    RANOLAZINE (RANEXA) 500 MG 12 HR TABLET    Take 1 tablet (500 mg) by mouth 2 times a day.    SULFAMETHOXAZOLE-TRIMETHOPRIM (BACTRIM DS) 800-160 MG TABLET    Take 1 tablet by mouth 2 times a day.   Modified Medications    No medications on file   Discontinued  Medications    ERGOCALCIFEROL, VITAMIN D2, (VITAMIN D2 ORAL)    Vitamin D TABS   Refills: 0       Active    OMEPRAZOLE (PRILOSEC) 20 MG DR CAPSULE    Take 1 capsule (20 mg) by mouth once daily.       Objective   Physical Exam  General: Patient in no acute distress   HEENT: Atraumatic normocephalic.  Neck: Supple, jugular venous pressure within normal limit.  No bruits  Lungs: Clear to auscultation bilaterally  Cardiovascular: Regular rate and rhythm, normal heart sounds, no murmurs rubs or gallops  Abdomen: Soft nontender nondistended.  Normal bowel sounds.  Extremities: Warm to touch, no edema.      Lab Review   No visits with results within 2 Month(s) from this visit.   Latest known visit with results is:   Office Visit on 12/14/2023   Component Date Value    Case Report 12/14/2023                      Value:Gynecologic Cytology                              Case: [id number]                                   Authorizing Provider:  PARIS Pizano  Collected:           12/14/2023 1436              Ordering Location:     [facility]   Received:            12/14/2023 1436                                     Center                                                                       First Screen:          BOSSMAN [place]                                                          Rescreen:              BOSSMAN [place]                                                               Specimen:    ThinPrep Liquid-Based Pap-Imaging System Screen, CERVIX, SCREENING                         Final Cytological Interp* 12/14/2023                      Value:This result contains rich text formatting which cannot be displayed here.      12/14/2023                      Value:This result contains rich text formatting which cannot be displayed here.    ThinPrep Imaging System 12/14/2023                      Value:This result contains rich text formatting which cannot be displayed here.    Educational Note  12/14/2023                      Value:This result contains rich text formatting which cannot be displayed here.    Perform HPV HR test? 12/14/2023 Always (all interpretations)     Include HPV Genotype? 12/14/2023 Yes     HPV, high-risk 12/14/2023 Negative     HPV Type 16 DNA 12/14/2023 Negative     HPV Type 18 DNA 12/14/2023 Negative     HPV non-Type 16 or 18 DNA 12/14/2023 Negative         Assessment/Plan   Patient Active Problem List   Diagnosis    Coronary arteriosclerosis    Hypertensive disorder    Bilateral hearing loss    Dry skin    Infection    Elevated alkaline phosphatase level    Gastroesophageal reflux disease    Mixed hyperlipidemia    Acquired short Achilles tendon of right lower extremity    Class 2 obesity with body mass index (BMI) of 35.0 to 35.9 in adult    Neck pain    Pain in pelvis    Hand pain    Hemorrhoids    Influenza-like illness    Injury of finger    Left upper quadrant pain    Nonrheumatic tricuspid valve disorder    Nose injury    Old myocardial infarction    Palpitations    Partial deafness    Pleural effusion    Rectal pain    Shortness of breath    Sore throat    Uterine leiomyoma    Venous thrombosis    Acute sinusitis    Acute upper respiratory infection    Chest wall pain    Biliary colic    Common cold    Cough      53 year old female patient here today following up on hx of CAD status post PCI to LAD. Patient returns to my office for follow-up. She did have cardiac catheterization 2019 that showed stable coronary artery disease and patent LAD stent.  Patient returns to my office for follow-up.  Doing overall good denies any chest pain or shortness of breath.  No dizziness lightheadedness or palpitations.   Patient doing overall good.  LDL at target.  Blood pressure well-controlled.  Stable from my standpoint.  Will follow-up in 6 months.        Aram Parsons MD

## 2024-05-10 DIAGNOSIS — I10 PRIMARY HYPERTENSION: ICD-10-CM

## 2024-05-10 NOTE — TELEPHONE ENCOUNTER
Refill-90 days    AMLODIPINE BESYLATE, BULK, MISC  10 mg, Daily           Summary: Take 10 mg by mouth once daily., Historical Med  Dose, Route, Frequency: 10 mg, oral, DailyOrd/Sold: 11/02/2023 (O)Ordered On: 11/02/2023ReportDx Associated: Taking: Hide From Proxies: Long-term: Med Note:              Edit  Patient Sig: Take 10 mg by mouth once daily.     -Patient has a few pills left.    [id number] [place] [address]

## 2024-05-13 DIAGNOSIS — I10 PRIMARY HYPERTENSION: ICD-10-CM

## 2024-05-13 RX ORDER — AMLODIPINE BESYLATE 10 MG/1
10 TABLET ORAL DAILY
Qty: 90 TABLET | Refills: 3 | Status: SHIPPED | OUTPATIENT
Start: 2024-05-13 | End: 2024-05-13 | Stop reason: SDUPTHER

## 2024-05-13 NOTE — TELEPHONE ENCOUNTER
Patient is requesting a refill of the following medication(s) for a 90 day supply with refills.   Please send the attached prescription(s) as soon as possible.   Thank you.     Requested Prescriptions     Pending Prescriptions Disp Refills    amLODIPine (Norvasc) 10 mg tablet 90 tablet 3     Sig: Take 1 tablet (10 mg) by mouth once daily.

## 2024-05-13 NOTE — TELEPHONE ENCOUNTER
Refill Request 5/11/24 Dispensed Days Supply Quantity Provider Pharmacy   AMLODIPINE BESYLATE 10MG TABLETS 11/05/2023 90 90 each Aram Parsons MD [facility] #..

## 2024-05-14 RX ORDER — AMLODIPINE BESYLATE 10 MG/1
10 TABLET ORAL DAILY
Qty: 90 TABLET | Refills: 3 | Status: SHIPPED | OUTPATIENT
Start: 2024-05-14 | End: 2025-05-09

## 2024-07-08 NOTE — PROGRESS NOTES
Baylee Peck is a 53 y.o. female who presents today for c/o swelling @ both LE and feet   LOV March 2023    Pt is deaf and NICHELLE is being utilized for this visit today     Chief Complaint   Patient presents with    Extremity Swelling     Patient is coming in today with a complain of swelling in both feet. Patient states this has been going on for approximately 4 months.       Symptoms: B/L LE and foot swelling x 4 months   Pt denies CP/SOB/ARCOS     Pt states she noticed BLE and foot swelling x 4 months ago  Does see cardiology and takes Amlodipine, but patient has petechiae on both LE so will refer to vascular for evaluation prior to changing medication     Allergies   Allergen Reactions    Acetaminophen Other    Amoxicillin Hives    Nitroglycerin Unknown and Itching    Diphenhydramine Hcl Other    Nitro Itching       Review of Systems  ROS was completed and all systems are negative with the exception of what was noted in the the HPI.       Objective   Vitals:  /81   Pulse 60   Ht 1.524 m (5')   Wt 81.5 kg (179 lb 9.6 oz)   LMP  (LMP Unknown)   SpO2 92%   BMI 35.08 kg/m²       Current Outpatient Medications   Medication Instructions    amLODIPine (NORVASC) 10 mg, oral, Daily    aspirin 325 mg tablet 1 tablet, oral, Daily    aspirin 81 mg, oral, Daily    atorvastatin (LIPITOR) 80 mg, oral, Daily    cholecalciferol (VITAMIN D-3) 25 mcg, oral, Daily    clopidogrel (PLAVIX) 75 mg, oral, Daily    ferrous sulfate 325 (65 Fe) MG tablet 65 mg of iron, oral, Daily    lisinopril 20 mg, oral, Daily    metoprolol tartrate (LOPRESSOR) 100 mg, oral, Daily    omeprazole (PRILOSEC) 20 mg, oral, Daily         Physical Exam  Cardiovascular:      Pulses: Normal pulses.   Pulmonary:      Effort: Pulmonary effort is normal.      Breath sounds: Normal breath sounds.   Skin:            Comments: B/L LE with generalized petechiae  1-2+ pitting edema to BLE and feet          Assessment/Plan   Problem List Items Addressed This  Visit             ICD-10-CM    Coronary arteriosclerosis - Primary I25.10     CAD status post PCI to LAD   Seeing cardiology, Dr. Uribe; LOV May 2024  Taking Metoprolol 100 mg and  mg   cardiac catheterization 201---> stable coronary artery disease and patent LAD stent.          Relevant Orders    Disability Placard    Bilateral hearing loss H91.93    Relevant Orders    Disability Placard    Obesity, morbid (Multi) E66.01    Malignant neoplasm of cervix, unspecified site (Multi) C53.9     Dx'd 2014  GYN: MITCH Murphy   PAP 2024          Other Visit Diagnoses         Codes    Foot swelling     M79.89    Swelling of lower extremity     M79.89    Relevant Orders    Referral to Vascular Medicine

## 2024-07-09 ENCOUNTER — APPOINTMENT (OUTPATIENT)
Dept: PRIMARY CARE | Facility: CLINIC | Age: 54
End: 2024-07-09
Payer: COMMERCIAL

## 2024-07-09 VITALS
WEIGHT: 179.6 LBS | OXYGEN SATURATION: 92 % | SYSTOLIC BLOOD PRESSURE: 123 MMHG | HEART RATE: 60 BPM | DIASTOLIC BLOOD PRESSURE: 81 MMHG | BODY MASS INDEX: 35.26 KG/M2 | HEIGHT: 60 IN

## 2024-07-09 DIAGNOSIS — M79.89 SWELLING OF LOWER EXTREMITY: ICD-10-CM

## 2024-07-09 DIAGNOSIS — H90.6 MIXED CONDUCTIVE AND SENSORINEURAL HEARING LOSS OF BOTH EARS: ICD-10-CM

## 2024-07-09 DIAGNOSIS — M79.89 FOOT SWELLING: ICD-10-CM

## 2024-07-09 DIAGNOSIS — I25.10 CORONARY ARTERIOSCLEROSIS: Primary | ICD-10-CM

## 2024-07-09 DIAGNOSIS — C53.9 MALIGNANT NEOPLASM OF CERVIX, UNSPECIFIED SITE (MULTI): ICD-10-CM

## 2024-07-09 DIAGNOSIS — E66.01 OBESITY, MORBID (MULTI): ICD-10-CM

## 2024-07-09 PROBLEM — J00 COMMON COLD: Status: RESOLVED | Noted: 2023-11-02 | Resolved: 2024-07-09

## 2024-07-09 PROBLEM — M79.643 HAND PAIN: Status: RESOLVED | Noted: 2023-11-02 | Resolved: 2024-07-09

## 2024-07-09 PROBLEM — R06.02 SHORTNESS OF BREATH: Status: RESOLVED | Noted: 2023-11-02 | Resolved: 2024-07-09

## 2024-07-09 PROBLEM — S09.92XA NOSE INJURY: Status: RESOLVED | Noted: 2023-11-02 | Resolved: 2024-07-09

## 2024-07-09 PROBLEM — R07.89 CHEST WALL PAIN: Status: RESOLVED | Noted: 2023-11-02 | Resolved: 2024-07-09

## 2024-07-09 PROBLEM — S69.90XA INJURY OF FINGER: Status: RESOLVED | Noted: 2023-11-02 | Resolved: 2024-07-09

## 2024-07-09 PROBLEM — J90 PLEURAL EFFUSION: Status: RESOLVED | Noted: 2023-11-02 | Resolved: 2024-07-09

## 2024-07-09 PROBLEM — K62.89 RECTAL PAIN: Status: RESOLVED | Noted: 2023-11-02 | Resolved: 2024-07-09

## 2024-07-09 PROBLEM — J02.9 SORE THROAT: Status: RESOLVED | Noted: 2023-11-02 | Resolved: 2024-07-09

## 2024-07-09 PROBLEM — J11.1 INFLUENZA-LIKE ILLNESS: Status: RESOLVED | Noted: 2023-11-02 | Resolved: 2024-07-09

## 2024-07-09 PROBLEM — R10.2 PAIN IN PELVIS: Status: RESOLVED | Noted: 2023-11-02 | Resolved: 2024-07-09

## 2024-07-09 PROBLEM — R10.12 LEFT UPPER QUADRANT PAIN: Status: RESOLVED | Noted: 2023-11-02 | Resolved: 2024-07-09

## 2024-07-09 PROBLEM — R05.9 COUGH: Status: RESOLVED | Noted: 2023-11-02 | Resolved: 2024-07-09

## 2024-07-09 PROBLEM — J06.9 ACUTE UPPER RESPIRATORY INFECTION: Status: RESOLVED | Noted: 2023-11-02 | Resolved: 2024-07-09

## 2024-07-09 PROBLEM — J01.90 ACUTE SINUSITIS: Status: RESOLVED | Noted: 2023-11-02 | Resolved: 2024-07-09

## 2024-07-09 PROBLEM — M54.2 NECK PAIN: Status: RESOLVED | Noted: 2023-11-02 | Resolved: 2024-07-09

## 2024-07-09 PROBLEM — B99.9 INFECTION: Status: RESOLVED | Noted: 2023-01-25 | Resolved: 2024-07-09

## 2024-07-09 PROCEDURE — 3074F SYST BP LT 130 MM HG: CPT | Performed by: NURSE PRACTITIONER

## 2024-07-09 PROCEDURE — 99213 OFFICE O/P EST LOW 20 MIN: CPT | Performed by: NURSE PRACTITIONER

## 2024-07-09 PROCEDURE — 3079F DIAST BP 80-89 MM HG: CPT | Performed by: NURSE PRACTITIONER

## 2024-07-09 PROCEDURE — 1036F TOBACCO NON-USER: CPT | Performed by: NURSE PRACTITIONER

## 2024-07-09 NOTE — PATIENT INSTRUCTIONS
Thank you for seeing me today.  It was a pleasure to see you again!    #B/L LE EDEMA/SWELLING  See vascular for evaluation of LE edema    No change in medications at this time    For assistance with scheduling referrals or consultations, please call [phone] or [phone].    For laboratory, radiology, and other tests, please call [phone] ([phone] for pediatrics).   If you do not get results within 7-10 days, or you have any questions or concerns, please send a message, call the office ([phone]), or return to the office for a follow-up appointment.     For acute/sick visits, if you are unable to get an office visit, you can do a  On Demand Virtual Visit that is accessible via your My Chart account.  For emergencies, call 9-1-1 or go to the nearest Emergency Department.     Please schedule additional appointment(s) to address concern(s) not addressed today.    Please review prescription inserts and published information for possible adverse effects of all medications.     In general, results are discussed over the phone or via  [employer].     You can see your health information, review clinical summaries from office visits & test results online when you follow your health with MY  Chart, a personal health record.   To sign up go to [email or web address].   If you need assistance with signing up or trouble getting into your account call [employer] Patient Line 24/7 at [phone]     RT AS NEEDED

## 2024-07-09 NOTE — ASSESSMENT & PLAN NOTE
CAD status post PCI to LAD   Seeing cardiology, Dr. Uribe; LOV May 2024  Taking Metoprolol 100 mg and  mg   cardiac catheterization 201---> stable coronary artery disease and patent LAD stent.

## 2024-08-14 ENCOUNTER — OFFICE VISIT (OUTPATIENT)
Dept: CARDIOLOGY | Facility: HOSPITAL | Age: 54
End: 2024-08-14
Payer: COMMERCIAL

## 2024-08-14 VITALS
SYSTOLIC BLOOD PRESSURE: 126 MMHG | HEART RATE: 59 BPM | WEIGHT: 182.54 LBS | OXYGEN SATURATION: 96 % | DIASTOLIC BLOOD PRESSURE: 82 MMHG | BODY MASS INDEX: 35.65 KG/M2

## 2024-08-14 DIAGNOSIS — I87.2 CHRONIC VENOUS INSUFFICIENCY: Primary | ICD-10-CM

## 2024-08-14 DIAGNOSIS — M79.89 SWELLING OF LOWER EXTREMITY: ICD-10-CM

## 2024-08-14 PROCEDURE — 3079F DIAST BP 80-89 MM HG: CPT | Performed by: INTERNAL MEDICINE

## 2024-08-14 PROCEDURE — 3074F SYST BP LT 130 MM HG: CPT | Performed by: INTERNAL MEDICINE

## 2024-08-14 PROCEDURE — 1036F TOBACCO NON-USER: CPT | Performed by: INTERNAL MEDICINE

## 2024-08-14 PROCEDURE — 99213 OFFICE O/P EST LOW 20 MIN: CPT | Performed by: INTERNAL MEDICINE

## 2024-08-14 NOTE — PATIENT INSTRUCTIONS
You can get compression socks at [employer].     Call the store in advance and find out when the person who measures you for socks will be there. Plan to go fairly early in the day. Many people have swelling that is worse toward the end of the day. If you are measured at the end of the day, you may not get a good fit.    You should put on your compression socks first thing in the morning. Take them off before bed.    If you use lotion or moisturizer on your legs, do NOT put lotion or moisturizer on immediately before you put on your socks, as it will make them difficult to pull up. Use your lotion or moisturizer at night.    Compression socks can be hand-washed or washed in a mesh bag in the washing machine. Air dry them. Do NOT put them in the dryer.    Should you have questions, please do not hesitate to call my office at [phone], or you can reach me on [employer] Portal (CAP) if you have signed up for it. If you have urgent concerns, call the office, do not use Consumer Agent Portal (CAP).

## 2024-08-14 NOTE — PROGRESS NOTES
Referred by     No chief complaint on file.      History of Present Illness:  Baylee Peck is a/an 53 y.o. woman with HTN, hyperlipidemia referred for ankle swelling     Patient is hearing-impaired; NICHELLE  used    Swelling for four months  Amlodipine 10 mg for about a year    A little associated discomfort and tightness    Not really worse toward the end of the day    Elevates her legs and thinks it's helpful  Has not tried compression socks    Fairly active  Chores around the house  Inst[employer] cart shopping   Driving a lot  Walks with friends      Past Medical History:   Diagnosis Date    Athscl heart disease of native coronary artery w/o ang pctrs 01/25/2023    Benign essential hypertension 01/25/2023    Chondrocostal junction syndrome (tietze) 04/07/2020    Costochondritis    Coronary arteriosclerosis 01/25/2023    Encounter for follow-up examination after completed treatment for conditions other than malignant neoplasm 04/07/2020    Hospital discharge follow-up    Encounter for follow-up examination after completed treatment for conditions other than malignant neoplasm 04/23/2019    Hospital discharge follow-up    Hyperlipidemia 01/25/2023    Hypertensive disorder 01/25/2023    Left upper quadrant pain 05/19/2020    Left upper quadrant abdominal pain    Mixed hyperlipidemia 01/25/2023    Nonrheumatic tricuspid valve disorder 11/02/2023    Old myocardial infarction 11/02/2023    Palpitations 11/02/2023    Personal history of diseases of the blood and blood-forming organs and certain disorders involving the immune mechanism 07/06/2016    History of iron deficiency anemia    Personal history of diseases of the blood and blood-forming organs and certain disorders involving the immune mechanism 05/27/2017    History of anemia    Personal history of other diseases of the digestive system 11/13/2014    History of constipation    Personal history of other diseases of the musculoskeletal system and  connective tissue 05/21/2019    History of low back pain    Personal history of other diseases of the respiratory system 10/24/2019    History of acute sinusitis    Personal history of other drug therapy 10/28/2016    History of influenza vaccination    Personal history of other endocrine, nutritional and metabolic disease 10/05/2018    History of hyperglycemia    Radiculopathy, site unspecified 11/13/2014    Radiculitis of leg    Strain of muscle, fascia and tendon of lower back, initial encounter 11/13/2014    Lumbar strain     Past Surgical History:   Procedure Laterality Date    OTHER SURGICAL HISTORY  11/12/2014    Thorax Excision Of Soft Tissue Tumor    OTHER SURGICAL HISTORY  03/09/2022    Cardiac catheterization with stent placement     Social History     Tobacco Use    Smoking status: Never     Passive exposure: Current (pt states she stay away from the smokers or have them go outside)    Smokeless tobacco: Never   Vaping Use    Vaping status: Never Used   Substance Use Topics    Alcohol use: Never    Drug use: Never     Family History   Problem Relation Name Age of Onset    Other (malignant neoplasm of female breast) Mother      Diabetes Father      No Known Problems Father's Sister      No Known Problems Father's Brother      Heart attack Maternal Grandmother      No Known Problems Paternal Grandmother      No Known Problems Paternal Grandfather       Current Outpatient Medications   Medication Sig Dispense Refill    amLODIPine (Norvasc) 10 mg tablet Take 1 tablet (10 mg) by mouth once daily. 90 tablet 3    aspirin 81 mg EC tablet Take 1 tablet (81 mg) by mouth once daily. 90 tablet 3    atorvastatin (Lipitor) 80 mg tablet Take 1 tablet (80 mg) by mouth once daily.      cholecalciferol (Vitamin D-3) 25 MCG (1000 UT) capsule Take 1 capsule (25 mcg) by mouth once daily. 90 capsule 3    ferrous sulfate 325 (65 Fe) MG tablet Take 1 tablet by mouth once daily.      lisinopril 20 mg tablet Take 1 tablet (20 mg)  by mouth once daily. 90 tablet 3    metoprolol tartrate (Lopressor) 100 mg tablet Take 1 tablet (100 mg) by mouth once daily. 90 tablet 3    omeprazole (PriLOSEC) 20 mg DR capsule Take 1 capsule (20 mg) by mouth once daily.      aspirin 325 mg tablet Take 1 tablet (325 mg) by mouth once daily.      clopidogrel (Plavix) 75 mg tablet Take 1 tablet (75 mg) by mouth once daily.       No current facility-administered medications for this visit.       Physical Examination:  Blood pressure 126/82, pulse 59, weight 82.8 kg (182 lb 8.7 oz), SpO2 96%.  General: well-developed, well-nourished, no distress  Head: normocephalic, atraumatic  Eyes: PERRL, anicteric sclerae, no conjunctival injection  ENT: normal oropharynx  Neck: supple, no carotid bruits, no JVD  Lungs: normal respiratory effort, clear to auscultation bilaterally  Heart: regular, normal S1 and S2, no murmurs, rubs or gallops  Abdomen: normal active bowel sounds, soft, non-distended, non-tender  Extremities: no cyanosis or clubbing  Vascular: mild pitting edema, pulses 2+ and symmetric  Musculoskeletal: no deformities  Neurological: alert and oriented, no gross neurological deficits  Psychological: normal mood and affect   Skin: warm and dry, no rashes or lesions      Pertinent Labs:    Pertinent Imaging:  Venous duplex ultrasound 3/13/2019   FINDINGS:  There is normal compressibility and flow within the visualized  vessels of the deep venous systems of the lower extremities. Both common  femoral veins are patent.     IMPRESSION:  No evidence for deep venous thrombosis within the limits of  this examination.    Diagnoses and all orders for this visit:  Chronic venous insufficiency (Primary)  -     Compression Stockings 20-30 mmHg  Swelling of lower extremity  -     Referral to Vascular Medicine    I will reach out to Amanda Freeman CNP to see if we can switch you off amlodipine to something different. I think this medication is likely contributing to your leg  swelling.    You can get compression socks at [employer].     Call the store in advance and find out when the person who measures you for socks will be there. Plan to go fairly early in the day. Many people have swelling that is worse toward the end of the day. If you are measured at the end of the day, you may not get a good fit.    You should put on your compression socks first thing in the morning. Take them off before bed.    If you use lotion or moisturizer on your legs, do NOT put lotion or moisturizer on immediately before you put on your socks, as it will make them difficult to pull up. Use your lotion or moisturizer at night.    Compression socks can be hand-washed or washed in a mesh bag in the washing machine. Air dry them. Do NOT put them in the dryer.    Should you have questions, please do not hesitate to call my office at [phone], or you can reach me on [employer] if you have signed up for it. If you have urgent concerns, call the office, do not use [employer].      Claribel Rose MD, MS

## 2024-09-03 ENCOUNTER — TELEPHONE (OUTPATIENT)
Dept: PRIMARY CARE | Facility: CLINIC | Age: 54
End: 2024-09-03
Payer: COMMERCIAL

## 2024-09-03 NOTE — TELEPHONE ENCOUNTER
I have made several attempts to reach this patient with no response.    ----- Message from Amanda Freeman sent at 8/17/2024 10:35 PM EDT -----  Regarding: FW: Switch amlodipine?  Please schedule pt for a visit. I need to make a med change per vascular doctors request    Nancy  ----- Message -----  From: Claribel Rose MD, MS  Sent: 8/14/2024   9:49 AM EDT  To: MARIBEL Reeves-CNP  Subject: Switch amlodipine?                               Amanda Brooks.    Could we switch Ms. Peck to a different BP med? I think the amlodipine 10 mg is contributing to her leg swelling.    Thanks!    Claribel  (Vasc med)

## 2024-09-13 DIAGNOSIS — E78.2 MIXED HYPERLIPIDEMIA: ICD-10-CM

## 2024-09-13 RX ORDER — ATORVASTATIN CALCIUM 80 MG/1
80 TABLET, FILM COATED ORAL DAILY
Qty: 90 TABLET | Refills: 3 | Status: SHIPPED | OUTPATIENT
Start: 2024-09-13 | End: 2025-09-08

## 2024-09-13 NOTE — TELEPHONE ENCOUNTER
Pharmacy is requesting a refill on pt behalf, pharmacy will let pt know when ready for \ out for delivery       Requested Prescriptions     Pending Prescriptions Disp Refills    atorvastatin (Lipitor) 80 mg tablet [Pharmacy Med Name: ATORVASTATIN 80MG TABLETS] 90 tablet 3     Sig: Take 1 tablet (80 mg) by mouth once daily.

## 2024-10-06 DIAGNOSIS — I10 PRIMARY HYPERTENSION: ICD-10-CM

## 2024-10-08 NOTE — TELEPHONE ENCOUNTER
Pharmacy is requesting a refill on pt behalf, pharmacy will let pt know when ready for \ out for delivery        Requested Prescriptions     Pending Prescriptions Disp Refills    lisinopril 20 mg tablet [Pharmacy Med Name: LISINOPRIL 20MG TABLETS] 90 tablet 3     Sig: Take 1 tablet (20 mg) by mouth once daily.

## 2024-10-09 RX ORDER — LISINOPRIL 20 MG/1
20 TABLET ORAL DAILY
Qty: 90 TABLET | Refills: 3 | Status: SHIPPED | OUTPATIENT
Start: 2024-10-09 | End: 2025-10-04

## 2024-11-01 ENCOUNTER — APPOINTMENT (OUTPATIENT)
Dept: CARDIOLOGY | Facility: CLINIC | Age: 54
End: 2024-11-01
Payer: COMMERCIAL

## 2024-11-19 ENCOUNTER — APPOINTMENT (OUTPATIENT)
Dept: CARDIOLOGY | Facility: CLINIC | Age: 54
End: 2024-11-19
Payer: COMMERCIAL

## 2024-12-11 NOTE — PROGRESS NOTES
Patient ID: Baylee Peck is a 54 y.o. female.  Primary Care Provider: Amanda Freeman, APRN-CNP    Subjective    History of stage IIB squamous cell cervical cancer diagnosed in November 2014.    Pretreatment MRI suggested cancer was extending to the outer margins of her cervix, abutting the rectum, and infiltrating into the proximal parametrial fat.  She had one left internal iliac lymph node which was PET avid.    She received chemoradiation therapy.  External beam radiation was completed in January 2015.  She then received 5 high-dose rate brachytherapy treatments at [facility].    This was completed in February 2015      Objective    Visit Vitals  /80 (BP Location: Left arm, Patient Position: Sitting, BP Cuff Size: Large adult)   Pulse 70   Resp 18        Interval history: Patient is a 54 year old female with history of stage 2B squamous cell cervical cancer s/p chemoradiation followed by vaginal brachytherapy. Last tx 2/2015. 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  Last pap 12/2023 was negative HPV, unable to interpret due to paucity of cellularity.   Here for annual exam and pap. Patient denies any vaginal bleeding, pink tinged discharge. Patient denies constipation or diarrhea. Patient states appetite is up and down which is normal for her. Energy levels are baseline. Patient denies hematuria. Patient denies urinary leakage or incontinence. Patient is not currently sexually active. Patients mammogram is due.          Physical Exam:    Constitutional: Doing well. KIERAN  Eyes: PERRL  ENMT: Moist mucus membranes  Head/Neck: Supple. Symmetrical  Cardiovascular: Regular, rate and rhythm. 2+ equal pulses of the extremities  Respiratory/Thorax: CTA. RRR. Chest rise symmetrical.  Gastrointestinal: Non-distended, soft, non-tender  Genitourinary: Cervical borders obliterated, os not visualized, no lesions noted, radiation changes noted.   Smooth vaginal walls noted.  No vulvar lesions noted.    Musculoskeletal: ROM intact, no joint swelling, normal strength  Extremities: No edema  Neurological: Alert and oriented x 3. Pleasant and cooperative.  Lymphatic: No lymphadenopathy. No lymphedema  Psychological: Appropriate mood and behavior  Skin: Warm and dry, no lesions, no rashes    A complete review of systems was performed and all systems were normal except what is noted in the interval history.          Assessment/Plan   Patient is a 54 year old female with Stage 2B squamous cell cervical cancer s/p chemoradiation followed by vaginal brachytherapy. Last tx 2/2015. KIERAN 9.5 years.  Vaginal atrophy.          PLAN:  Patient declined Estrace.   Mammogram ordered.  Pap today, F/U results   If negative F/U in 1 year or as needed   Physical examination was within normal limits today.  She is currently KIERAN.  We reviewed signs and symptoms of possible recurrence with the patient and she will call our office should she experience any of these.      Sanna DUNN  12/12/2024; 11:45     I was present with the APRN student who participated in the documentation of this note.  I have personally seen and re-examined the patient and performed the medical decision-making components (assessment and plan of care). I have reviewed the APRN student documentation and verified the findings in the note as written with additions or exceptions as stated in the body of this note.

## 2024-12-12 ENCOUNTER — APPOINTMENT (OUTPATIENT)
Dept: GYNECOLOGIC ONCOLOGY | Facility: CLINIC | Age: 54
End: 2024-12-12
Payer: COMMERCIAL

## 2024-12-12 VITALS
HEIGHT: 60 IN | SYSTOLIC BLOOD PRESSURE: 118 MMHG | BODY MASS INDEX: 36.57 KG/M2 | DIASTOLIC BLOOD PRESSURE: 80 MMHG | HEART RATE: 70 BPM | RESPIRATION RATE: 18 BRPM | WEIGHT: 186.29 LBS

## 2024-12-12 DIAGNOSIS — Z12.31 SCREENING MAMMOGRAM, ENCOUNTER FOR: Primary | ICD-10-CM

## 2024-12-12 DIAGNOSIS — C53.9 MALIGNANT NEOPLASM OF CERVIX, UNSPECIFIED SITE (MULTI): ICD-10-CM

## 2024-12-12 PROCEDURE — 87624 HPV HI-RISK TYP POOLED RSLT: CPT

## 2024-12-12 PROCEDURE — 1036F TOBACCO NON-USER: CPT | Performed by: NURSE PRACTITIONER

## 2024-12-12 PROCEDURE — 3008F BODY MASS INDEX DOCD: CPT | Performed by: NURSE PRACTITIONER

## 2024-12-12 PROCEDURE — 99214 OFFICE O/P EST MOD 30 MIN: CPT | Performed by: NURSE PRACTITIONER

## 2024-12-12 PROCEDURE — 88142 CYTOPATH C/V THIN LAYER: CPT

## 2024-12-12 PROCEDURE — 3079F DIAST BP 80-89 MM HG: CPT | Performed by: NURSE PRACTITIONER

## 2024-12-12 PROCEDURE — 3074F SYST BP LT 130 MM HG: CPT | Performed by: NURSE PRACTITIONER

## 2024-12-12 ASSESSMENT — PAIN SCALES - GENERAL: PAINLEVEL_OUTOF10: 0-NO PAIN

## 2024-12-12 NOTE — PROGRESS NOTES
Patient ID: Baylee Peck is a 54 y.o. female.  Primary Care Provider: Amanda Freeman, APRN-CNP    Subjective    History of stage IIB squamous cell cervical cancer diagnosed in November 2014.    Pretreatment MRI suggested cancer was extending to the outer margins of her cervix, abutting the rectum, and infiltrating into the proximal parametrial fat.  She had one left internal iliac lymph node which was PET avid.    She received chemoradiation therapy.  External beam radiation was completed in January 2015.  She then received 5 high-dose rate brachytherapy treatments at [facility].    This was completed in February 2015      Objective    Visit Vitals  /80 (BP Location: Left arm, Patient Position: Sitting, BP Cuff Size: Large adult)   Pulse 70   Resp 18        Interval history: Patient is a 54 year old female with history of stage 2B squamous cell cervical cancer s/p chemoradiation followed by vaginal brachytherapy. Last tx 2/2015. 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  Last pap 12/2023 was negative HPV, unable to interpret due to paucity of cellularity. Here for annual exam and pap. Patient denies any vaginal bleeding, pink tinged discharge. Patient denies constipation or diarrhea. Patient states appetite is up and down which is normal for her. Energy levels are baseline. Patient denies hematuria. Patient denies urinary leakage or incontinence. Patient is not currently sexually active. Patients mammogram is due.       Physical Exam:    Constitutional: Doing well. KIERAN  Eyes: PERRL  ENMT: Moist mucus membranes  Head/Neck: Supple. Symmetrical  Cardiovascular: Regular, rate and rhythm. 2+ equal pulses of the extremities  Respiratory/Thorax: CTA. RRR. Chest rise symmetrical.  Gastrointestinal: Non-distended, soft, non-tender  Genitourinary: Normal external female genitalia. No vulvar lesions noted. Cervical borders obliterated, os visualized, no lesions noted, radiation changes noted. Pin-prick bleeding  with Pap. Smooth vaginal walls noted.  No vulvar lesions noted. Smooth rectovaginal septum.  Musculoskeletal: ROM intact, no joint swelling, normal strength  Extremities: No edema  Neurological: Alert and oriented x 3. Pleasant and cooperative.  Lymphatic: No lymphadenopathy. No lymphedema  Psychological: Appropriate mood and behavior  Skin: Warm and dry, no lesions, no rashes    A complete review of systems was performed and all systems were normal except what is noted in the interval history.          Assessment/Plan   Patient is a 54 year old female with Stage 2B squamous cell cervical cancer s/p chemoradiation followed by vaginal brachytherapy. Last tx 2/2015. KIERAN 9.5 years.      PLAN:  Patient declined Estrace.   Mammogram ordered.  Pap today; F/U results   If negative F/U in 1 year or as needed   Physical examination was within normal limits today.  She is currently KIERAN.  We reviewed signs and symptoms of possible recurrence with the patient and she will call our office should she experience any of these.     Sanna DUNN  12/12/2024; 11:45    I was present with the APRN student who participated in the documentation of this note.  I have personally seen and re-examined the patient and performed the medical decision-making components (assessment and plan of care). I have reviewed the APRN student documentation and verified the findings in the note as written with additions or exceptions as stated in the body of this note.

## 2024-12-20 LAB
CYTOLOGY CMNT CVX/VAG CYTO-IMP: NORMAL
HPV HR 12 DNA GENITAL QL NAA+PROBE: NEGATIVE
HPV HR GENOTYPES PNL CVX NAA+PROBE: NEGATIVE
HPV16 DNA SPEC QL NAA+PROBE: NEGATIVE
HPV18 DNA SPEC QL NAA+PROBE: NEGATIVE
LAB AP HPV GENOTYPE QUESTION: YES
LAB AP HPV HR: NORMAL
LABORATORY COMMENT REPORT: NORMAL
LABORATORY COMMENT REPORT: NORMAL
PATH REPORT.TOTAL CANCER: NORMAL

## 2024-12-26 ENCOUNTER — HOSPITAL ENCOUNTER (OUTPATIENT)
Dept: RADIOLOGY | Facility: HOSPITAL | Age: 54
Discharge: HOME | End: 2024-12-26
Payer: COMMERCIAL

## 2024-12-26 VITALS — HEIGHT: 60 IN | BODY MASS INDEX: 35.93 KG/M2 | WEIGHT: 183 LBS

## 2024-12-26 DIAGNOSIS — Z12.31 SCREENING MAMMOGRAM, ENCOUNTER FOR: ICD-10-CM

## 2024-12-26 PROCEDURE — 77067 SCR MAMMO BI INCL CAD: CPT

## 2024-12-31 DIAGNOSIS — I10 PRIMARY HYPERTENSION: ICD-10-CM

## 2024-12-31 RX ORDER — METOPROLOL TARTRATE 100 MG/1
100 TABLET ORAL DAILY
Qty: 90 TABLET | Refills: 3 | Status: SHIPPED | OUTPATIENT
Start: 2024-12-31 | End: 2025-12-26

## 2025-01-14 ENCOUNTER — APPOINTMENT (OUTPATIENT)
Dept: CARDIOLOGY | Facility: CLINIC | Age: 55
End: 2025-01-14
Payer: COMMERCIAL

## 2025-01-14 VITALS
SYSTOLIC BLOOD PRESSURE: 119 MMHG | HEIGHT: 60 IN | DIASTOLIC BLOOD PRESSURE: 71 MMHG | WEIGHT: 182 LBS | TEMPERATURE: 98.6 F | RESPIRATION RATE: 18 BRPM | HEART RATE: 68 BPM | OXYGEN SATURATION: 98 % | BODY MASS INDEX: 35.73 KG/M2

## 2025-01-14 DIAGNOSIS — I36.9 NONRHEUMATIC TRICUSPID VALVE DISORDER: ICD-10-CM

## 2025-01-14 DIAGNOSIS — I10 PRIMARY HYPERTENSION: ICD-10-CM

## 2025-01-14 DIAGNOSIS — E78.2 MIXED HYPERLIPIDEMIA: ICD-10-CM

## 2025-01-14 DIAGNOSIS — I25.10 CORONARY ARTERIOSCLEROSIS: Primary | ICD-10-CM

## 2025-01-14 DIAGNOSIS — I25.2 OLD MYOCARDIAL INFARCTION: ICD-10-CM

## 2025-01-14 PROCEDURE — 99213 OFFICE O/P EST LOW 20 MIN: CPT | Performed by: INTERNAL MEDICINE

## 2025-01-14 PROCEDURE — 3074F SYST BP LT 130 MM HG: CPT | Performed by: INTERNAL MEDICINE

## 2025-01-14 PROCEDURE — 3008F BODY MASS INDEX DOCD: CPT | Performed by: INTERNAL MEDICINE

## 2025-01-14 PROCEDURE — 3078F DIAST BP <80 MM HG: CPT | Performed by: INTERNAL MEDICINE

## 2025-01-14 PROCEDURE — 1036F TOBACCO NON-USER: CPT | Performed by: INTERNAL MEDICINE

## 2025-01-14 ASSESSMENT — ENCOUNTER SYMPTOMS
OCCASIONAL FEELINGS OF UNSTEADINESS: 0
DEPRESSION: 0
LOSS OF SENSATION IN FEET: 0

## 2025-01-14 ASSESSMENT — LIFESTYLE VARIABLES
SKIP TO QUESTIONS 9-10: 1
HOW OFTEN DO YOU HAVE A DRINK CONTAINING ALCOHOL: NEVER
HOW OFTEN DO YOU HAVE SIX OR MORE DRINKS ON ONE OCCASION: NEVER
AUDIT-C TOTAL SCORE: 0
HOW MANY STANDARD DRINKS CONTAINING ALCOHOL DO YOU HAVE ON A TYPICAL DAY: PATIENT DOES NOT DRINK

## 2025-01-14 ASSESSMENT — PATIENT HEALTH QUESTIONNAIRE - PHQ9
2. FEELING DOWN, DEPRESSED OR HOPELESS: NOT AT ALL
1. LITTLE INTEREST OR PLEASURE IN DOING THINGS: NOT AT ALL
SUM OF ALL RESPONSES TO PHQ9 QUESTIONS 1 AND 2: 0

## 2025-01-14 ASSESSMENT — PAIN SCALES - GENERAL: PAINLEVEL_OUTOF10: 0-NO PAIN

## 2025-01-14 NOTE — PROGRESS NOTES
History of present illness:  54 year old female patient here today following up on hx of CAD status post PCI to LAD. Patient returns to my office for follow-up. She did have cardiac catheterization 2019 that showed stable coronary artery disease and patent LAD stent.  Patient returns to my office for follow-up.  Doing overall good denies any chest pain or shortness of breath.  No dizziness lightheadedness or palpitations.    Past Medical History:   Diagnosis Date    Athscl heart disease of native coronary artery w/o ang pctrs 01/25/2023    Benign essential hypertension 01/25/2023    Chondrocostal junction syndrome (tietze) 04/07/2020    Costochondritis    Coronary arteriosclerosis 01/25/2023    Encounter for follow-up examination after completed treatment for conditions other than malignant neoplasm 04/07/2020    Hospital discharge follow-up    Encounter for follow-up examination after completed treatment for conditions other than malignant neoplasm 04/23/2019    Hospital discharge follow-up    Hyperlipidemia 01/25/2023    Hypertensive disorder 01/25/2023    Left upper quadrant pain 05/19/2020    Left upper quadrant abdominal pain    Mixed hyperlipidemia 01/25/2023    Nonrheumatic tricuspid valve disorder 11/02/2023    Old myocardial infarction 11/02/2023    Palpitations 11/02/2023    Personal history of diseases of the blood and blood-forming organs and certain disorders involving the immune mechanism 07/06/2016    History of iron deficiency anemia    Personal history of diseases of the blood and blood-forming organs and certain disorders involving the immune mechanism 05/27/2017    History of anemia    Personal history of other diseases of the digestive system 11/13/2014    History of constipation    Personal history of other diseases of the musculoskeletal system and connective tissue 05/21/2019    History of low back pain    Personal history of other diseases of the respiratory system 10/24/2019    History of  acute sinusitis    Personal history of other drug therapy 10/28/2016    History of influenza vaccination    Personal history of other endocrine, nutritional and metabolic disease 10/05/2018    History of hyperglycemia    Radiculopathy, site unspecified 11/13/2014    Radiculitis of leg    Strain of muscle, fascia and tendon of lower back, initial encounter 11/13/2014    Lumbar strain       Past Surgical History:   Procedure Laterality Date    OTHER SURGICAL HISTORY  11/12/2014    Thorax Excision Of Soft Tissue Tumor    OTHER SURGICAL HISTORY  03/09/2022    Cardiac catheterization with stent placement       Allergies   Allergen Reactions    Acetaminophen Other    Amoxicillin Hives    Nitroglycerin Unknown and Itching    Diphenhydramine Hcl Other    Nitro Itching        reports that she has never smoked. She has been exposed to tobacco smoke. She has never used smokeless tobacco. She reports that she does not drink alcohol and does not use drugs.    Family History   Problem Relation Name Age of Onset    Breast cancer Mother      Other (malignant neoplasm of female breast) Mother      Diabetes Father      Heart attack Maternal Grandmother      No Known Problems Paternal Grandmother      No Known Problems Paternal Grandfather      No Known Problems Father's Sister      No Known Problems Father's Brother         Patient's Medications   New Prescriptions    No medications on file   Previous Medications    AMLODIPINE (NORVASC) 10 MG TABLET    Take 1 tablet (10 mg) by mouth once daily.    ASPIRIN 81 MG EC TABLET    Take 1 tablet (81 mg) by mouth once daily.    ATORVASTATIN (LIPITOR) 80 MG TABLET    Take 1 tablet (80 mg) by mouth once daily.    CHOLECALCIFEROL (VITAMIN D-3) 25 MCG (1000 UT) CAPSULE    Take 1 capsule (25 mcg) by mouth once daily.    FERROUS SULFATE 325 (65 FE) MG TABLET    Take 1 tablet (325 mg) by mouth once daily.    LISINOPRIL 20 MG TABLET    Take 1 tablet (20 mg) by mouth once daily.    METOPROLOL TARTRATE  (LOPRESSOR) 100 MG TABLET    Take 1 tablet (100 mg) by mouth once daily.    OMEPRAZOLE (PRILOSEC) 20 MG DR CAPSULE    Take 1 capsule (20 mg) by mouth once daily.   Modified Medications    No medications on file   Discontinued Medications    No medications on file       Objective   Physical Exam  General: Patient in no acute distress   HEENT: Atraumatic normocephalic.  Neck: Supple, jugular venous pressure within normal limit.  No bruits  Lungs: Clear to auscultation bilaterally  Cardiovascular: Regular rate and rhythm, normal heart sounds, no murmurs rubs or gallops  Abdomen: Soft nontender nondistended.  Normal bowel sounds.  Extremities: Warm to touch, no edema.      Lab Review   Office Visit on 12/12/2024   Component Date Value    Case Report 12/12/2024                      Value:Gynecologic Cytology                              Case: [id number]                                   Authorizing Provider:  PARIS Pizano  Collected:           12/12/2024 1428              Ordering Location:     [facility]   Received:            12/12/2024 1428                                     Center                                                                       First Screen:          BOSSMAN Leonard                                                              Rescreen:              BOSSMAN Lockett                                                              Specimen:    ThinPrep Liquid-Based Pap-Manual Screen, CERVIX, SCREENING                                 Final Cytological Interp* 12/12/2024                      Value:    A. THINPREP PAP CERVIX, SCREENING -     Specimen Adequacy  Satisfactory for evaluation; endocervical/transformation zone component is present  Quality Indicator: Paucity of cellularity    General Categorization  Negative for intraepithelial lesion or malignancy.    Descriptive Interpretation  Negative for intraepithelial lesion or malignancy  Cellular changes consistent with  atrophy              12/12/2024                      Value:Slide(s) initially screened by BOSSMAN FORMAN at [facility] [address]  [facility] [id number]    QC review performed by BOSSMAN Kaiser at [facility] [address] [id number]  By the signature on this report, the individual or group listed as making the Final Interpretation/Diagnosis certifies that they have reviewed this case.       Educational Note 12/12/2024                      Value:Cervical cytology is a screening procedure primarily for squamous cancers and precursors and has associated false-negative and false-positives results as evidenced by published data. Your patient's test should be interpreted in this context, together with the patient's history and clinical findings. Regular sampling and follow-up of unexplained clinical signs and symptoms are recommended to minimize false negative results.      Perform HPV HR test? 12/12/2024 Always (all interpretations)     Include HPV Genotype? 12/12/2024 Yes     HPV, high-risk 12/12/2024 Negative     HPV Type 16 DNA 12/12/2024 Negative     HPV Type 18 DNA 12/12/2024 Negative     HPV non-Type 16 or 18 DNA 12/12/2024 Negative         Assessment/Plan   Patient Active Problem List   Diagnosis    Coronary arteriosclerosis    Hypertensive disorder    Bilateral hearing loss    Dry skin    Elevated alkaline phosphatase level    Gastroesophageal reflux disease    Mixed hyperlipidemia    Acquired short Achilles tendon of right lower extremity    Obesity, morbid (Multi)    Hemorrhoids    Nonrheumatic tricuspid valve disorder    Old myocardial infarction    Palpitations    Partial deafness    Uterine leiomyoma    Venous thrombosis    Biliary colic    Malignant neoplasm of cervix, unspecified site (Multi)        54 year old female patient here today following up on hx of CAD status post PCI to LAD. Patient returns to my office for follow-up. She did have cardiac  catheterization 2019 that showed stable coronary artery disease and patent LAD stent.  Patient returns to my office for follow-up.  Doing overall good denies any chest pain or shortness of breath.  No dizziness lightheadedness or palpitations.   Patient doing overall good.  LDL at target.  Blood pressure well-controlled.  Stable from my standpoint.  Will follow-up in 6 months.     Aram Parsons MD

## 2025-02-24 DIAGNOSIS — I10 PRIMARY HYPERTENSION: ICD-10-CM

## 2025-02-24 RX ORDER — AMLODIPINE BESYLATE 10 MG/1
10 TABLET ORAL DAILY
Qty: 90 TABLET | Refills: 3 | Status: SHIPPED | OUTPATIENT
Start: 2025-02-24 | End: 2026-02-19

## 2025-02-24 NOTE — TELEPHONE ENCOUNTER
Please send the attached prescription to the patient's pharmacy as soon as possible. Thank you!    Requested Prescriptions     Pending Prescriptions Disp Refills    amLODIPine (Norvasc) 10 mg tablet 90 tablet 3     Sig: Take 1 tablet (10 mg) by mouth once daily.

## 2025-04-08 ENCOUNTER — TELEPHONE (OUTPATIENT)
Dept: PRIMARY CARE | Facility: CLINIC | Age: 55
End: 2025-04-08

## 2025-04-08 ENCOUNTER — APPOINTMENT (OUTPATIENT)
Dept: PRIMARY CARE | Facility: CLINIC | Age: 55
End: 2025-04-08
Payer: COMMERCIAL

## 2025-04-08 VITALS
TEMPERATURE: 98.7 F | DIASTOLIC BLOOD PRESSURE: 80 MMHG | HEIGHT: 59 IN | BODY MASS INDEX: 37.17 KG/M2 | SYSTOLIC BLOOD PRESSURE: 122 MMHG | OXYGEN SATURATION: 95 % | HEART RATE: 57 BPM | WEIGHT: 184.4 LBS

## 2025-04-08 DIAGNOSIS — E78.2 MIXED HYPERLIPIDEMIA: ICD-10-CM

## 2025-04-08 DIAGNOSIS — I10 PRIMARY HYPERTENSION: ICD-10-CM

## 2025-04-08 DIAGNOSIS — Z11.59 NEED FOR HEPATITIS C SCREENING TEST: ICD-10-CM

## 2025-04-08 DIAGNOSIS — Z00.00 ANNUAL PHYSICAL EXAM: Primary | ICD-10-CM

## 2025-04-08 DIAGNOSIS — Z23 ENCOUNTER FOR IMMUNIZATION: ICD-10-CM

## 2025-04-08 DIAGNOSIS — Z12.11 ENCOUNTER FOR SCREENING FOR MALIGNANT NEOPLASM OF COLON: ICD-10-CM

## 2025-04-08 DIAGNOSIS — Z11.4 ENCOUNTER FOR SCREENING FOR HIV: ICD-10-CM

## 2025-04-08 PROBLEM — Z20.822 CONTACT WITH AND (SUSPECTED) EXPOSURE TO COVID-19: Status: RESOLVED | Noted: 2022-07-25 | Resolved: 2025-04-08

## 2025-04-08 PROBLEM — R73.9 HYPERGLYCEMIA: Status: ACTIVE | Noted: 2025-04-08

## 2025-04-08 PROBLEM — E66.9 OBESITY WITH BODY MASS INDEX 30 OR GREATER: Status: ACTIVE | Noted: 2025-04-08

## 2025-04-08 PROBLEM — K59.00 CONSTIPATION: Status: RESOLVED | Noted: 2025-04-08 | Resolved: 2025-04-08

## 2025-04-08 PROCEDURE — 99386 PREV VISIT NEW AGE 40-64: CPT

## 2025-04-08 PROCEDURE — 3079F DIAST BP 80-89 MM HG: CPT

## 2025-04-08 PROCEDURE — G0009 ADMIN PNEUMOCOCCAL VACCINE: HCPCS

## 2025-04-08 PROCEDURE — G8433 SCR FOR DEP NOT CPT DOC RSN: HCPCS

## 2025-04-08 PROCEDURE — 3008F BODY MASS INDEX DOCD: CPT

## 2025-04-08 PROCEDURE — 1036F TOBACCO NON-USER: CPT

## 2025-04-08 PROCEDURE — 90677 PCV20 VACCINE IM: CPT

## 2025-04-08 PROCEDURE — 3074F SYST BP LT 130 MM HG: CPT

## 2025-04-08 RX ORDER — NAPROXEN SODIUM 220 MG/1
1 TABLET, FILM COATED ORAL
COMMUNITY
Start: 2024-08-18

## 2025-04-08 ASSESSMENT — ACTIVITIES OF DAILY LIVING (ADL)
BATHING: INDEPENDENT
DRESSING: INDEPENDENT
GROCERY_SHOPPING: INDEPENDENT
TAKING_MEDICATION: INDEPENDENT
MANAGING_FINANCES: INDEPENDENT
DOING_HOUSEWORK: INDEPENDENT

## 2025-04-08 ASSESSMENT — COLUMBIA-SUICIDE SEVERITY RATING SCALE - C-SSRS
1. IN THE PAST MONTH, HAVE YOU WISHED YOU WERE DEAD OR WISHED YOU COULD GO TO SLEEP AND NOT WAKE UP?: NO
6. HAVE YOU EVER DONE ANYTHING, STARTED TO DO ANYTHING, OR PREPARED TO DO ANYTHING TO END YOUR LIFE?: NO
2. HAVE YOU ACTUALLY HAD ANY THOUGHTS OF KILLING YOURSELF?: NO

## 2025-04-08 ASSESSMENT — VISUAL ACUITY: OU: 1

## 2025-04-08 NOTE — ASSESSMENT & PLAN NOTE
Chronic condition, stable with medication  Continue atorvastatin 80 mg daily  Labs: Ordered at this visit, will follow-up with results for any medication adjustment  Reports any new onset of muscle pain or weakness.  Continue with health diet low in saturated fats, cholesterol, sodium, and limit sugars.  Exercise 30-45 minutes 5 days per week.  Follow up in 6 months.     Orders:    Lipid Panel; Future

## 2025-04-08 NOTE — PROGRESS NOTES
Subjective   Patient ID: Baylee Peck is a 54 y.o. female who presents for Annual Exam (Pain in groin area , disturbance when walking /).    HPI     Baylee Peck presents for annual physical exam.  She is a new patient to this provider, establishing care today.  Previously a patient of Amanda Freeman CNP.     Baylee was born deaf.  Today's visit is being conducted in conjunction with Kaushik byrne.    Patient's recent visit notes, medication and allergy lists, past medical surgical social hx, immunization, vitals, problem list, recent tests were reviewed by me for pertinence to this visit.      PMH:   Hypertension, hyperlipidemia, coronary artery disease, GERD, deaf    History of MI, history of cervical cancer      Specialists:  Cardiology-Dr. Parsons for CAD, hypertension, history of MI, nonrheumatic heart valve disease    Gastroenterology-[facility] for GERD, gastritis    Diagnostic-history of malignant neoplasm of cervix    Vascular-Dr. Rose for chronic venous insufficiency, lower leg edema    Social Hx:   Single  Smoking: No  Alcohol: No  Recreational drug use: No      Screening tools:  EKG: Yes - 5/2024 with cardiology  Colon cancer screening: Yes - Cologuard 2021, FIT test 2024.  Agreeable to repeat Cologuard   Mammogram: Yes - 12/2024  PAP: Yes - 12/2024- NILM/neg HPV      Vaccinations:  Tdap: UTD  Flu Vaccine: D  Shingles: UTD  Prevnar 20: update at today's visit      Review of Systems  GENERAL - Denies fever/chills, recent illness, unexplained weight loss  HEENT- Denies change in vision, double vision, blurred. Denies hearing changes, ear pain. Denies nose bleeds. Denies sore throat, difficulty swallowing.    RESP - Denies SOB or cough  CVS - Denies CP, palpitations  GI - Denies nausea or abdominal pain, hematochezia/melena  - Denies urinary frequency, urgency or incontinence.  Denies nocturia.   NEURO - Denies headache, dizziness  MSK - Denies neck or back pain.  Positive for  "right-sided groin pain with prolonged movement.  Skin - Denies abnormal lesions, rash  PSYCH-Denies anxiety, depression, changes in mood      Objective     /80 (BP Location: Left arm, Patient Position: Sitting, BP Cuff Size: Adult)   Pulse 57   Temp 37.1 °C (98.7 °F) (Temporal)   Ht 1.499 m (4' 11\")   Wt 83.6 kg (184 lb 6.4 oz)   LMP  (LMP Unknown)   SpO2 95%   BMI 37.24 kg/m²     Physical Exam  Vitals and nursing note reviewed.   Constitutional:       General: She is not in acute distress.     Appearance: Normal appearance. She is well-developed and well-groomed.   HENT:      Head: Normocephalic.      Jaw: There is normal jaw occlusion.      Right Ear: Tympanic membrane, ear canal and external ear normal.      Left Ear: Tympanic membrane, ear canal and external ear normal.      Ears:      Comments: Deaf, using interpretation for this visit, typically reads lips and sign language for communication     Nose: Nose normal.      Mouth/Throat:      Lips: Pink.      Mouth: Mucous membranes are moist.      Pharynx: Oropharynx is clear. Uvula midline.   Eyes:      General: Lids are normal. Vision grossly intact. Gaze aligned appropriately.      Extraocular Movements: Extraocular movements intact.      Conjunctiva/sclera: Conjunctivae normal.      Pupils: Pupils are equal, round, and reactive to light.   Neck:      Thyroid: No thyromegaly or thyroid tenderness.      Vascular: No carotid bruit or JVD.      Trachea: Trachea and phonation normal.   Cardiovascular:      Rate and Rhythm: Normal rate and regular rhythm.      Pulses: Normal pulses.      Heart sounds: Normal heart sounds, S1 normal and S2 normal.   Pulmonary:      Effort: Pulmonary effort is normal.      Breath sounds: Normal breath sounds and air entry.   Abdominal:      General: Bowel sounds are normal. There is no distension.      Palpations: Abdomen is soft. There is no hepatomegaly, splenomegaly or mass.      Tenderness: There is no abdominal " tenderness. There is no right CVA tenderness, left CVA tenderness, guarding or rebound.   Musculoskeletal:         General: Normal range of motion.      Cervical back: Normal, full passive range of motion without pain, normal range of motion and neck supple.      Thoracic back: Normal.      Lumbar back: Normal.      Right lower leg: No edema.      Left lower leg: No edema.   Lymphadenopathy:      Cervical: No cervical adenopathy.   Skin:     General: Skin is warm and dry.      Capillary Refill: Capillary refill takes less than 2 seconds.   Neurological:      General: No focal deficit present.      Mental Status: She is alert and oriented to person, place, and time.   Psychiatric:         Attention and Perception: Attention normal.         Mood and Affect: Mood and affect normal.         Speech: Speech normal.         Behavior: Behavior normal. Behavior is cooperative.           Assessment & Plan  Annual physical exam  Well adult exam.  1. Age appropriate preventative measures reviewed.   2. Encouraged healthy diet and exercise.  3. Immunizations- Reviewed  4. Labs- ordered at this visit, will follow up with results  5. Medications- Reviewed    *Follow-up in 1 year for repeat annual physical exam. Patient verbalizes understanding  regarding plan of care and all questions answered.    Orders:    CBC and Auto Differential; Future    Comprehensive metabolic panel; Future    Lipid Panel; Future    Primary hypertension  Chronic condition, stable at this visit  Continue amlodipine 10 mg daily, metoprolol 100 mg daily, lisinopril 20 mg daily as prescribed by her cardiologist  Monitor home blood pressures.  Call if blood pressure consistently >140/90.  Non pharmacological interventions such as lowering salt, saturated fats, cholesterol, and sugar diet discussed.  Increase physical activity, aim for 30 minutes 5 days per week as able.  Stress reduction interventions discussed.  Discussed signs and symptoms of major  cardiovascular event and need to present to the ED.   Reevaluate in 6 months.    Orders:    CBC and Auto Differential; Future    Comprehensive metabolic panel; Future    Mixed hyperlipidemia  Chronic condition, stable with medication  Continue atorvastatin 80 mg daily  Labs: Ordered at this visit, will follow-up with results for any medication adjustment  Reports any new onset of muscle pain or weakness.  Continue with health diet low in saturated fats, cholesterol, sodium, and limit sugars.  Exercise 30-45 minutes 5 days per week.  Follow up in 6 months.     Orders:    Lipid Panel; Future    Encounter for screening for malignant neoplasm of colon    Orders:    Cologuard® colon cancer screening; Future    Encounter for immunization    Orders:    Pneumococcal conjugate vaccine, 20-valent (PREVNAR 20)    Need for hepatitis C screening test    Orders:    Hepatitis C antibody; Future    Encounter for screening for HIV    Orders:    HIV 1/2 Antigen/Antibody Screen with Reflex to Confirmation; Future

## 2025-04-08 NOTE — ASSESSMENT & PLAN NOTE
Chronic condition, stable at this visit  Continue amlodipine 10 mg daily, metoprolol 100 mg daily, lisinopril 20 mg daily as prescribed by her cardiologist  Monitor home blood pressures.  Call if blood pressure consistently >140/90.  Non pharmacological interventions such as lowering salt, saturated fats, cholesterol, and sugar diet discussed.  Increase physical activity, aim for 30 minutes 5 days per week as able.  Stress reduction interventions discussed.  Discussed signs and symptoms of major cardiovascular event and need to present to the ED.   Reevaluate in 6 months.    Orders:    CBC and Auto Differential; Future    Comprehensive metabolic panel; Future

## 2025-05-03 LAB
ALBUMIN SERPL-MCNC: 4.5 G/DL (ref 3.6–5.1)
ALP SERPL-CCNC: 111 U/L (ref 37–153)
ALT SERPL-CCNC: 22 U/L (ref 6–29)
ANION GAP SERPL CALCULATED.4IONS-SCNC: 10 MMOL/L (CALC) (ref 7–17)
AST SERPL-CCNC: 15 U/L (ref 10–35)
BASOPHILS # BLD AUTO: 31 CELLS/UL (ref 0–200)
BASOPHILS NFR BLD AUTO: 0.5 %
BILIRUB SERPL-MCNC: 0.6 MG/DL (ref 0.2–1.2)
BUN SERPL-MCNC: 13 MG/DL (ref 7–25)
CALCIUM SERPL-MCNC: 9.4 MG/DL (ref 8.6–10.4)
CHLORIDE SERPL-SCNC: 102 MMOL/L (ref 98–110)
CHOLEST SERPL-MCNC: 135 MG/DL
CHOLEST/HDLC SERPL: 2.9 (CALC)
CO2 SERPL-SCNC: 30 MMOL/L (ref 20–32)
CREAT SERPL-MCNC: 0.58 MG/DL (ref 0.5–1.03)
EGFRCR SERPLBLD CKD-EPI 2021: 107 ML/MIN/1.73M2
EOSINOPHIL # BLD AUTO: 112 CELLS/UL (ref 15–500)
EOSINOPHIL NFR BLD AUTO: 1.8 %
ERYTHROCYTE [DISTWIDTH] IN BLOOD BY AUTOMATED COUNT: 12.5 % (ref 11–15)
GLUCOSE SERPL-MCNC: 108 MG/DL (ref 65–99)
HBA1C MFR BLD: 6.6 %
HCT VFR BLD AUTO: 40.4 % (ref 35–45)
HCV AB SERPL QL IA: NORMAL
HDLC SERPL-MCNC: 47 MG/DL
HGB BLD-MCNC: 13.6 G/DL (ref 11.7–15.5)
HIV 1+2 AB+HIV1 P24 AG SERPL QL IA: NORMAL
LDLC SERPL CALC-MCNC: 70 MG/DL (CALC)
LYMPHOCYTES # BLD AUTO: 2592 CELLS/UL (ref 850–3900)
LYMPHOCYTES NFR BLD AUTO: 41.8 %
MCH RBC QN AUTO: 29.8 PG (ref 27–33)
MCHC RBC AUTO-ENTMCNC: 33.7 G/DL (ref 32–36)
MCV RBC AUTO: 88.6 FL (ref 80–100)
MONOCYTES # BLD AUTO: 415 CELLS/UL (ref 200–950)
MONOCYTES NFR BLD AUTO: 6.7 %
NEUTROPHILS # BLD AUTO: 3050 CELLS/UL (ref 1500–7800)
NEUTROPHILS NFR BLD AUTO: 49.2 %
NONHDLC SERPL-MCNC: 88 MG/DL (CALC)
PLATELET # BLD AUTO: 261 THOUSAND/UL (ref 140–400)
PMV BLD REES-ECKER: 10.1 FL (ref 7.5–12.5)
POTASSIUM SERPL-SCNC: 3.9 MMOL/L (ref 3.5–5.3)
PROT SERPL-MCNC: 6.8 G/DL (ref 6.1–8.1)
RBC # BLD AUTO: 4.56 MILLION/UL (ref 3.8–5.1)
SODIUM SERPL-SCNC: 142 MMOL/L (ref 135–146)
TRIGL SERPL-MCNC: 97 MG/DL
WBC # BLD AUTO: 6.2 THOUSAND/UL (ref 3.8–10.8)

## 2025-05-15 ENCOUNTER — APPOINTMENT (OUTPATIENT)
Dept: PRIMARY CARE | Facility: CLINIC | Age: 55
End: 2025-05-15
Payer: COMMERCIAL

## 2025-05-15 ENCOUNTER — TELEPHONE (OUTPATIENT)
Dept: PRIMARY CARE | Facility: CLINIC | Age: 55
End: 2025-05-15

## 2025-05-15 VITALS
BODY MASS INDEX: 37.29 KG/M2 | WEIGHT: 185 LBS | HEIGHT: 59 IN | OXYGEN SATURATION: 97 % | TEMPERATURE: 98 F | HEART RATE: 68 BPM | DIASTOLIC BLOOD PRESSURE: 82 MMHG | SYSTOLIC BLOOD PRESSURE: 136 MMHG

## 2025-05-15 DIAGNOSIS — E11.9 TYPE 2 DIABETES MELLITUS WITHOUT COMPLICATION, WITHOUT LONG-TERM CURRENT USE OF INSULIN: ICD-10-CM

## 2025-05-15 DIAGNOSIS — E11.9 TYPE 2 DIABETES MELLITUS WITHOUT COMPLICATION, WITHOUT LONG-TERM CURRENT USE OF INSULIN: Primary | ICD-10-CM

## 2025-05-15 PROCEDURE — 3075F SYST BP GE 130 - 139MM HG: CPT

## 2025-05-15 PROCEDURE — G2211 COMPLEX E/M VISIT ADD ON: HCPCS

## 2025-05-15 PROCEDURE — G8433 SCR FOR DEP NOT CPT DOC RSN: HCPCS

## 2025-05-15 PROCEDURE — 3008F BODY MASS INDEX DOCD: CPT

## 2025-05-15 PROCEDURE — 99213 OFFICE O/P EST LOW 20 MIN: CPT

## 2025-05-15 PROCEDURE — 3079F DIAST BP 80-89 MM HG: CPT

## 2025-05-15 PROCEDURE — 4010F ACE/ARB THERAPY RXD/TAKEN: CPT

## 2025-05-15 ASSESSMENT — COLUMBIA-SUICIDE SEVERITY RATING SCALE - C-SSRS
2. HAVE YOU ACTUALLY HAD ANY THOUGHTS OF KILLING YOURSELF?: NO
1. IN THE PAST MONTH, HAVE YOU WISHED YOU WERE DEAD OR WISHED YOU COULD GO TO SLEEP AND NOT WAKE UP?: NO
6. HAVE YOU EVER DONE ANYTHING, STARTED TO DO ANYTHING, OR PREPARED TO DO ANYTHING TO END YOUR LIFE?: NO

## 2025-05-15 ASSESSMENT — PATIENT HEALTH QUESTIONNAIRE - PHQ9
SUM OF ALL RESPONSES TO PHQ9 QUESTIONS 1 AND 2: 0
2. FEELING DOWN, DEPRESSED OR HOPELESS: NOT AT ALL
1. LITTLE INTEREST OR PLEASURE IN DOING THINGS: NOT AT ALL

## 2025-05-15 NOTE — PROGRESS NOTES
"Subjective     Patient ID: Baylee Peck is a 54 y.o. female who presents for Follow-up.      HPI    Baylee presents for follow up of abnormal labs.  Using Martti for this appointment as Baylee is deaf and requires translation. A1c elevated at 6.6%.  This is a new finding for her.  She denies any symptoms associated with diabetes.  She admits her diet is not well-managed, very little exercise.    Patient's recent visit notes, medication and allergy lists, past medical surgical social hx, immunization, vitals, problem list, recent tests were reviewed by me for pertinence to this visit.        Review of Systems  All other systems have been reviewed and are negative except as noted in the HPI.         Objective   /82 (BP Location: Left arm, Patient Position: Lying, BP Cuff Size: Adult)   Pulse 68   Temp 36.7 °C (98 °F) (Temporal)   Ht 1.499 m (4' 11\")   Wt 83.9 kg (185 lb)   LMP  (LMP Unknown)   SpO2 97%   BMI 37.37 kg/m²       Physical Exam  Nursing notes and vitals reviewed  General appearance - AAOx3, non distressed  CVS - Warm and well perfused  RESP - No respiratory distress  PSYCH - Normal thought content, fluent and appropriate speech        Assessment & Plan  Type 2 diabetes mellitus without complication, without long-term current use of insulin  New onset diabetes  We discussed diabetes management at length with use of Martti to interpret.  We discussed alternate such as diet and exercise versus medication management.  She is agreeable to altering her diet and incorporating exercise into her life to manage glucose levels.  Provided multiple handouts on diet and lifestyle interventions for diabetes.  We will hold off on medications at this time and follow-up in 3 months to repeat A1c.  Will plan to initiate medication at next visit if A1c remains above target with diet and exercise interventions.                 Miranda Valdivia, APRN-CNP  "

## 2025-05-16 LAB — NONINV COLON CA DNA+OCC BLD SCRN STL QL: NEGATIVE

## 2025-07-15 ENCOUNTER — APPOINTMENT (OUTPATIENT)
Dept: CARDIOLOGY | Facility: CLINIC | Age: 55
End: 2025-07-15
Payer: COMMERCIAL

## 2025-07-15 VITALS
BODY MASS INDEX: 35.75 KG/M2 | DIASTOLIC BLOOD PRESSURE: 80 MMHG | OXYGEN SATURATION: 94 % | HEART RATE: 54 BPM | RESPIRATION RATE: 16 BRPM | SYSTOLIC BLOOD PRESSURE: 130 MMHG | WEIGHT: 177 LBS

## 2025-07-15 DIAGNOSIS — R00.2 PALPITATIONS: ICD-10-CM

## 2025-07-15 DIAGNOSIS — I25.2 OLD MYOCARDIAL INFARCTION: ICD-10-CM

## 2025-07-15 DIAGNOSIS — I25.10 CORONARY ARTERIOSCLEROSIS: ICD-10-CM

## 2025-07-15 DIAGNOSIS — I36.9 NONRHEUMATIC TRICUSPID VALVE DISORDER: ICD-10-CM

## 2025-07-15 DIAGNOSIS — E78.2 MIXED HYPERLIPIDEMIA: ICD-10-CM

## 2025-07-15 DIAGNOSIS — I10 BENIGN ESSENTIAL HYPERTENSION: Primary | ICD-10-CM

## 2025-07-15 DIAGNOSIS — I10 PRIMARY HYPERTENSION: ICD-10-CM

## 2025-07-15 PROCEDURE — 3075F SYST BP GE 130 - 139MM HG: CPT | Performed by: INTERNAL MEDICINE

## 2025-07-15 PROCEDURE — 3079F DIAST BP 80-89 MM HG: CPT | Performed by: INTERNAL MEDICINE

## 2025-07-15 PROCEDURE — 99214 OFFICE O/P EST MOD 30 MIN: CPT | Performed by: INTERNAL MEDICINE

## 2025-07-15 PROCEDURE — 1036F TOBACCO NON-USER: CPT | Performed by: INTERNAL MEDICINE

## 2025-07-15 ASSESSMENT — PAIN SCALES - GENERAL: PAINLEVEL_OUTOF10: 0-NO PAIN

## 2025-07-15 ASSESSMENT — ENCOUNTER SYMPTOMS
OCCASIONAL FEELINGS OF UNSTEADINESS: 0
LOSS OF SENSATION IN FEET: 0
DEPRESSION: 0

## 2025-07-15 NOTE — PROGRESS NOTES
[facility] [place]    Interventional Cardiology    History of present illness:  54 year old female patient here today following up on hx of CAD status post PCI to LAD. Patient returns to my office for follow-up. She did have cardiac catheterization 2019 that showed stable coronary artery disease and patent LAD stent.  Doing overall good denies any chest pain or shortness of breath.  No dizziness lightheadedness or palpitations.     Medical History[1]    Surgical History[2]    Allergies[3]     reports that she has never smoked. She has been exposed to tobacco smoke. She has never used smokeless tobacco. She reports that she does not drink alcohol and does not use drugs.    Family History[4]    Patient's Medications   New Prescriptions    No medications on file   Previous Medications    AMLODIPINE (NORVASC) 10 MG TABLET    Take 1 tablet (10 mg) by mouth once daily.    ASPIRIN 81 MG CHEWABLE TABLET    Chew and swallow 1 tablet (81 mg) early in the morning..    ATORVASTATIN (LIPITOR) 80 MG TABLET    Take 1 tablet (80 mg) by mouth once daily.    FERROUS SULFATE 325 (65 FE) MG TABLET    Take 1 tablet by mouth once daily.    LISINOPRIL 20 MG TABLET    Take 1 tablet (20 mg) by mouth once daily.    METOPROLOL TARTRATE (LOPRESSOR) 100 MG TABLET    Take 1 tablet (100 mg) by mouth once daily.    OMEPRAZOLE (PRILOSEC) 20 MG DR CAPSULE    Take 1 capsule (20 mg) by mouth once daily.   Modified Medications    No medications on file   Discontinued Medications    No medications on file       Objective   Physical Exam  General: Patient in no acute distress   HEENT: Atraumatic normocephalic.  Neck: Supple, jugular venous pressure within normal limit.  No bruits  Lungs: Clear to auscultation bilaterally  Cardiovascular: Regular rate and rhythm, normal heart sounds, no murmurs rubs or gallops  Abdomen: Soft nontender nondistended.  Normal bowel sounds.  Extremities: Warm to touch, no  edema.    Lab Review   No visits with results within 2 Month(s) from this visit.   Latest known visit with results is:   Office Visit on 04/08/2025   Component Date Value    NONINV COLON CA DNA+OCC * 05/06/2025 Negative     WHITE BLOOD CELL COUNT 04/30/2025 6.2     RED BLOOD CELL COUNT 04/30/2025 4.56     HEMOGLOBIN 04/30/2025 13.6     HEMATOCRIT 04/30/2025 40.4     MCV 04/30/2025 88.6     MCH 04/30/2025 29.8     MCHC 04/30/2025 33.7     RDW 04/30/2025 12.5     PLATELET COUNT 04/30/2025 261     MPV 04/30/2025 10.1     ABSOLUTE NEUTROPHILS 04/30/2025 3,050     ABSOLUTE LYMPHOCYTES 04/30/2025 2,592     ABSOLUTE MONOCYTES 04/30/2025 415     ABSOLUTE EOSINOPHILS 04/30/2025 112     ABSOLUTE BASOPHILS 04/30/2025 31     NEUTROPHILS 04/30/2025 49.2     LYMPHOCYTES 04/30/2025 41.8     MONOCYTES 04/30/2025 6.7     EOSINOPHILS 04/30/2025 1.8     BASOPHILS 04/30/2025 0.5     GLUCOSE 04/30/2025 108 (H)     UREA NITROGEN (BUN) 04/30/2025 13     CREATININE 04/30/2025 0.58     EGFR 04/30/2025 107     SODIUM 04/30/2025 142     POTASSIUM 04/30/2025 3.9     CHLORIDE 04/30/2025 102     CARBON DIOXIDE 04/30/2025 30     ELECTROLYTE BALANCE 04/30/2025 10     CALCIUM 04/30/2025 9.4     PROTEIN, TOTAL 04/30/2025 6.8     ALBUMIN 04/30/2025 4.5     BILIRUBIN, TOTAL 04/30/2025 0.6     ALKALINE PHOSPHATASE 04/30/2025 111     AST 04/30/2025 15     ALT 04/30/2025 22     CHOLESTEROL, TOTAL 04/30/2025 135     HDL CHOLESTEROL 04/30/2025 47 (L)     TRIGLYCERIDES 04/30/2025 97     LDL-CHOLESTEROL 04/30/2025 70     CHOL/HDLC RATIO 04/30/2025 2.9     NON HDL CHOLESTEROL 04/30/2025 88     HIV AG/AB, 4TH GEN 04/30/2025 NON-REACTIVE     HEPATITIS C ANTIBODY 04/30/2025 NON-REACTIVE     HEMOGLOBIN A1c 04/30/2025 6.6 (H)         Assessment/Plan   Problem List[5]     54 year old female patient here today following up on hx of CAD status post PCI to LAD. Patient returns to my office for follow-up. She did have cardiac catheterization 2019 that showed stable  coronary artery disease and patent LAD stent.  Doing overall good denies any chest pain or shortness of breath.  No dizziness lightheadedness or palpitations.     Patient blood pressure and heart rate well-controlled.  LDL at target.  Stable from cardiac standpoint.  Recommend to continue aspirin, atorvastatin 80 mg oral daily,  Lisinopril 20 mg oral daily and amlodipine 10 mg daily.  Will follow-up in 1 year.  Discussed with her lifestyle modification.      Aram Parsons MD              [1]   Past Medical History:  Diagnosis Date    Athscl heart disease of native coronary artery w/o ang pctrs 01/25/2023    Benign essential hypertension 01/25/2023    Chondrocostal junction syndrome (tietze) 04/07/2020    Costochondritis    Constipation 04/08/2025    Contact with and (suspected) exposure to covid-19 07/25/2022    Coronary arteriosclerosis 01/25/2023    Encounter for follow-up examination after completed treatment for conditions other than malignant neoplasm 04/07/2020    Hospital discharge follow-up    Encounter for follow-up examination after completed treatment for conditions other than malignant neoplasm 04/23/2019    Hospital discharge follow-up    Hyperlipidemia 01/25/2023    Hypertensive disorder 01/25/2023    Left upper quadrant pain 05/19/2020    Left upper quadrant abdominal pain    Mixed hyperlipidemia 01/25/2023    Nonrheumatic tricuspid valve disorder 11/02/2023    Old myocardial infarction 11/02/2023    Palpitations 11/02/2023    Personal history of diseases of the blood and blood-forming organs and certain disorders involving the immune mechanism 07/06/2016    History of iron deficiency anemia    Personal history of diseases of the blood and blood-forming organs and certain disorders involving the immune mechanism 05/27/2017    History of anemia    Personal history of other diseases of the digestive system 11/13/2014    History of constipation    Personal history of other diseases of the musculoskeletal  system and connective tissue 05/21/2019    History of low back pain    Personal history of other diseases of the respiratory system 10/24/2019    History of acute sinusitis    Personal history of other drug therapy 10/28/2016    History of influenza vaccination    Personal history of other endocrine, nutritional and metabolic disease 10/05/2018    History of hyperglycemia    Radiculopathy, site unspecified 11/13/2014    Radiculitis of leg    Strain of muscle, fascia and tendon of lower back, initial encounter 11/13/2014    Lumbar strain   [2]   Past Surgical History:  Procedure Laterality Date    OTHER SURGICAL HISTORY  11/12/2014    Thorax Excision Of Soft Tissue Tumor    OTHER SURGICAL HISTORY  03/09/2022    Cardiac catheterization with stent placement   [3]   Allergies  Allergen Reactions    Acetaminophen Other    Amoxicillin Hives    Nitroglycerin Unknown and Itching    Diphenhydramine Hcl Other    Nitro Itching   [4]   Family History  Problem Relation Name Age of Onset    Breast cancer Mother      Other (malignant neoplasm of female breast) Mother      Diabetes Father      Heart attack Maternal Grandmother      No Known Problems Paternal Grandmother      No Known Problems Paternal Grandfather      No Known Problems Father's Sister      No Known Problems Father's Brother     [5]   Patient Active Problem List  Diagnosis    Coronary arteriosclerosis    Hypertensive disorder    Bilateral hearing loss    Dry skin    Elevated alkaline phosphatase level    Gastroesophageal reflux disease    Mixed hyperlipidemia    Acquired short Achilles tendon of right lower extremity    Obesity, morbid (Multi)    Hemorrhoids    Nonrheumatic tricuspid valve disorder    Old myocardial infarction    Palpitations    Partial deafness    Uterine leiomyoma    Venous thrombosis    Biliary colic    Malignant neoplasm of cervix, unspecified site (Multi)    Benign essential hypertension    Hyperglycemia    Obesity with body mass index 30 or  greater

## 2025-08-14 ENCOUNTER — TELEPHONE (OUTPATIENT)
Dept: PRIMARY CARE | Facility: CLINIC | Age: 55
End: 2025-08-14
Payer: COMMERCIAL

## 2025-08-15 ENCOUNTER — TELEPHONE (OUTPATIENT)
Dept: PRIMARY CARE | Facility: CLINIC | Age: 55
End: 2025-08-15

## 2025-08-15 ENCOUNTER — APPOINTMENT (OUTPATIENT)
Dept: PRIMARY CARE | Facility: CLINIC | Age: 55
End: 2025-08-15
Payer: COMMERCIAL

## 2025-08-15 VITALS
HEART RATE: 63 BPM | WEIGHT: 177.8 LBS | TEMPERATURE: 98.4 F | BODY MASS INDEX: 35.91 KG/M2 | DIASTOLIC BLOOD PRESSURE: 78 MMHG | OXYGEN SATURATION: 92 % | SYSTOLIC BLOOD PRESSURE: 120 MMHG

## 2025-08-15 DIAGNOSIS — E11.9 TYPE 2 DIABETES MELLITUS WITHOUT COMPLICATION, WITHOUT LONG-TERM CURRENT USE OF INSULIN: Primary | ICD-10-CM

## 2025-08-15 DIAGNOSIS — E11.9 TYPE 2 DIABETES MELLITUS WITHOUT COMPLICATION, WITHOUT LONG-TERM CURRENT USE OF INSULIN: ICD-10-CM

## 2025-08-15 LAB — POC HEMOGLOBIN A1C: 6.6 % (ref 4.2–6.5)

## 2025-08-15 PROCEDURE — 4010F ACE/ARB THERAPY RXD/TAKEN: CPT

## 2025-08-15 PROCEDURE — 99214 OFFICE O/P EST MOD 30 MIN: CPT

## 2025-08-15 PROCEDURE — 83036 HEMOGLOBIN GLYCOSYLATED A1C: CPT

## 2025-08-15 PROCEDURE — 3074F SYST BP LT 130 MM HG: CPT

## 2025-08-15 PROCEDURE — 3044F HG A1C LEVEL LT 7.0%: CPT

## 2025-08-15 PROCEDURE — G2211 COMPLEX E/M VISIT ADD ON: HCPCS

## 2025-08-15 PROCEDURE — 3078F DIAST BP <80 MM HG: CPT

## 2025-08-15 RX ORDER — METFORMIN HYDROCHLORIDE 500 MG/1
1000 TABLET, EXTENDED RELEASE ORAL
Qty: 200 TABLET | Refills: 1 | Status: SHIPPED | OUTPATIENT
Start: 2025-08-15 | End: 2026-03-03

## 2025-08-15 ASSESSMENT — PAIN SCALES - GENERAL: PAINLEVEL_OUTOF10: 0-NO PAIN

## 2025-08-16 ASSESSMENT — ENCOUNTER SYMPTOMS
NAUSEA: 0
RESPIRATORY NEGATIVE: 1
WEAKNESS: 0
ABDOMINAL PAIN: 0
FATIGUE: 0
PALPITATIONS: 0
CONSTIPATION: 0
DIARRHEA: 0
POLYPHAGIA: 0
LIGHT-HEADEDNESS: 0
DIZZINESS: 0
ACTIVITY CHANGE: 0
APPETITE CHANGE: 0
POLYDIPSIA: 0
VOMITING: 0

## 2025-08-25 DIAGNOSIS — I25.112 ATHEROSCLEROTIC HEART DISEASE OF NATIVE CORONARY ARTERY WITH REFRACTORY ANGINA PECTORIS: Primary | ICD-10-CM

## 2025-08-27 RX ORDER — NAPROXEN SODIUM 220 MG/1
81 TABLET, FILM COATED ORAL DAILY
Qty: 90 TABLET | Refills: 3 | Status: SHIPPED | OUTPATIENT
Start: 2025-08-27

## 2025-08-29 DIAGNOSIS — I25.10 CORONARY ARTERY DISEASE INVOLVING NATIVE CORONARY ARTERY OF NATIVE HEART WITHOUT ANGINA PECTORIS: ICD-10-CM

## 2025-09-05 RX ORDER — VIT C/E/ZN/COPPR/LUTEIN/ZEAXAN 250MG-90MG
25 CAPSULE ORAL DAILY
Qty: 90 CAPSULE | Refills: 3 | Status: SHIPPED | OUTPATIENT
Start: 2025-09-05 | End: 2026-08-31

## 2025-11-18 ENCOUNTER — APPOINTMENT (OUTPATIENT)
Dept: PRIMARY CARE | Facility: CLINIC | Age: 55
End: 2025-11-18
Payer: COMMERCIAL

## 2025-12-11 ENCOUNTER — APPOINTMENT (OUTPATIENT)
Dept: GYNECOLOGIC ONCOLOGY | Facility: CLINIC | Age: 55
End: 2025-12-11
Payer: COMMERCIAL

## 2026-04-09 ENCOUNTER — APPOINTMENT (OUTPATIENT)
Dept: PRIMARY CARE | Facility: CLINIC | Age: 56
End: 2026-04-09
Payer: COMMERCIAL